# Patient Record
Sex: FEMALE | Race: WHITE | Employment: FULL TIME | URBAN - NONMETROPOLITAN AREA
[De-identification: names, ages, dates, MRNs, and addresses within clinical notes are randomized per-mention and may not be internally consistent; named-entity substitution may affect disease eponyms.]

---

## 2018-06-02 ENCOUNTER — HOSPITAL ENCOUNTER (OUTPATIENT)
Dept: GENERAL RADIOLOGY | Age: 23
Discharge: HOME | End: 2018-06-04
Payer: COMMERCIAL

## 2018-06-02 ENCOUNTER — OFFICE VISIT (OUTPATIENT)
Dept: PRIMARY CARE CLINIC | Age: 23
End: 2018-06-02
Payer: COMMERCIAL

## 2018-06-02 ENCOUNTER — HOSPITAL ENCOUNTER (OUTPATIENT)
Age: 23
Discharge: HOME | End: 2018-06-04
Payer: COMMERCIAL

## 2018-06-02 VITALS
TEMPERATURE: 100.1 F | OXYGEN SATURATION: 98 % | BODY MASS INDEX: 31.39 KG/M2 | DIASTOLIC BLOOD PRESSURE: 76 MMHG | HEART RATE: 78 BPM | WEIGHT: 200 LBS | HEIGHT: 67 IN | SYSTOLIC BLOOD PRESSURE: 108 MMHG

## 2018-06-02 DIAGNOSIS — M79.645 PAIN OF LEFT THUMB: ICD-10-CM

## 2018-06-02 DIAGNOSIS — S63.642A SPRAIN OF METACARPOPHALANGEAL (MCP) JOINT OF LEFT THUMB, INITIAL ENCOUNTER: Primary | ICD-10-CM

## 2018-06-02 PROCEDURE — 99203 OFFICE O/P NEW LOW 30 MIN: CPT | Performed by: FAMILY MEDICINE

## 2018-06-02 PROCEDURE — 73130 X-RAY EXAM OF HAND: CPT

## 2018-06-02 PROCEDURE — L3908 WHO COCK-UP NONMOLDE PRE OTS: HCPCS | Performed by: FAMILY MEDICINE

## 2018-06-02 RX ORDER — PREDNISONE 20 MG/1
TABLET ORAL
Qty: 10 TABLET | Refills: 0 | Status: SHIPPED | OUTPATIENT
Start: 2018-06-02 | End: 2020-03-25

## 2018-06-02 ASSESSMENT — PATIENT HEALTH QUESTIONNAIRE - PHQ9
SUM OF ALL RESPONSES TO PHQ QUESTIONS 1-9: 0
SUM OF ALL RESPONSES TO PHQ9 QUESTIONS 1 & 2: 0
2. FEELING DOWN, DEPRESSED OR HOPELESS: 0
1. LITTLE INTEREST OR PLEASURE IN DOING THINGS: 0

## 2018-06-03 ASSESSMENT — ENCOUNTER SYMPTOMS
GASTROINTESTINAL NEGATIVE: 1
RESPIRATORY NEGATIVE: 1
EYES NEGATIVE: 1
BACK PAIN: 0

## 2020-03-25 ENCOUNTER — OFFICE VISIT (OUTPATIENT)
Dept: PRIMARY CARE CLINIC | Age: 25
End: 2020-03-25
Payer: COMMERCIAL

## 2020-03-25 VITALS
DIASTOLIC BLOOD PRESSURE: 74 MMHG | TEMPERATURE: 98.6 F | OXYGEN SATURATION: 100 % | SYSTOLIC BLOOD PRESSURE: 122 MMHG | HEART RATE: 84 BPM

## 2020-03-25 PROCEDURE — 99213 OFFICE O/P EST LOW 20 MIN: CPT | Performed by: NURSE PRACTITIONER

## 2020-03-25 PROCEDURE — 93000 ELECTROCARDIOGRAM COMPLETE: CPT | Performed by: NURSE PRACTITIONER

## 2020-03-25 ASSESSMENT — PATIENT HEALTH QUESTIONNAIRE - PHQ9
SUM OF ALL RESPONSES TO PHQ QUESTIONS 1-9: 0
1. LITTLE INTEREST OR PLEASURE IN DOING THINGS: 0
SUM OF ALL RESPONSES TO PHQ QUESTIONS 1-9: 0
2. FEELING DOWN, DEPRESSED OR HOPELESS: 0
SUM OF ALL RESPONSES TO PHQ9 QUESTIONS 1 & 2: 0

## 2020-03-25 ASSESSMENT — ENCOUNTER SYMPTOMS
NAUSEA: 0
VOMITING: 0
COUGH: 0
ABDOMINAL PAIN: 0
RESPIRATORY NEGATIVE: 1

## 2020-03-25 NOTE — PATIENT INSTRUCTIONS
Patient Education        Cardiac Arrhythmia: Care Instructions  Your Care Instructions    A cardiac arrhythmia is a change in the normal rhythm of the heart. Your heart may beat too fast or too slow or beat with an irregular or skipping rhythm. A change in the heart's rhythm may feel like a really strong heartbeat or a fluttering in your chest. A severe heart rhythm problem can keep the body from getting the blood it needs. This can result in shortness of breath, lightheadedness, and fainting. You may take medicine to treat your condition. Your doctor may recommend a pacemaker or recommend catheter ablation to destroy small parts of the heart that are causing a rhythm problem. Another possible treatment is an implantable cardioverter-defibrillator (ICD). An ICD is a device that gives the heart a shock to return the heart to a normal rhythm. Follow-up care is a key part of your treatment and safety. Be sure to make and go to all appointments, and call your doctor if you are having problems. It's also a good idea to know your test results and keep a list of the medicines you take. How can you care for yourself at home?  [facility]    · Be safe with medicines. Take your medicines exactly as prescribed. Call your doctor if you think you are having a problem with your medicine. You will get more details on the specific medicines your doctor prescribes.     · If you received a pacemaker or an ICD, you will get a fact sheet about it.     · Wear medical alert jewelry that says you have an abnormal heart rhythm. You can buy this at most drugstores.    Lifestyle changes    · Eat a heart-healthy diet.     · Stay at a healthy weight. Lose weight if you need to.     · Avoid nicotine, too much alcohol, and illegal drugs (meth, speed, and cocaine). Also, get enough sleep and do not overeat.     · Ask your doctor whether you can take over-the-counter medicines (such as decongestants).  These can make your heart beat fast.   · Talk to your doctor about any limits to activities, such as driving, or tasks where you use power tools or ladders. Activity    · Start light exercise if your doctor says you can. Even a small amount will help you get stronger, have more energy, and manage your stress.     · Get regular exercise. Try for 30 minutes on most days of the week. Ask your doctor what level of exercise is safe for you. If activity is not likely to cause health problems, you probably do not have limits on the type or level of activity that you can do. You may want to walk, swim, bike, or do other activities.     · When you exercise, watch for signs that your heart is working too hard. You are pushing too hard if you cannot talk while you exercise. If you become short of breath or dizzy or have chest pain, sit down and rest.     · Check your pulse daily. Place two fingers on the artery at the palm side of your wrist, in line with your thumb. If your heartbeat seems uneven, talk to your doctor. When should you call for help? Call 911 anytime you think you may need emergency care. For example, call if:    · You have symptoms of sudden heart failure. These may include:  ? Severe trouble breathing. ? A fast or irregular heartbeat. ? Coughing up pink, foamy mucus. ? You passed out.     · You have signs of a stroke. These include:  ? Sudden numbness, paralysis, or weakness in your face, arm, or leg, especially on only one side of your body. ? New problems with walking or balance. ? Sudden vision changes. ? Drooling or slurred speech. ? New problems speaking or understanding simple statements, or feeling confused. ? A sudden, severe headache that is different from past headaches.    Call your doctor now or seek immediate medical care if:    · You have new or changed symptoms of heart failure, such as:  ? New or increased shortness of breath. ? New or worse swelling in your legs, ankles, or feet.   ? Sudden weight gain, such as more than 2 to 3 pounds in a day or 5 pounds in a week. (Your doctor may suggest a different range of weight gain.)  ? Feeling dizzy or lightheaded or like you may faint. ? Feeling so tired or weak that you cannot do your usual activities. ? Not sleeping well. Shortness of breath wakes you at night. You need extra pillows to prop yourself up to breathe easier.    Watch closely for changes in your health, and be sure to contact your doctor if:    · You do not get better as expected. Where can you learn more? Go to https://[facility].[employer]. org and sign in to your [employer] account. Enter S588 in the [employer] box to learn more about \"Cardiac Arrhythmia: Care Instructions. \"     If you do not have an account, please click on the \"Sign Up Now\" link. Current as of: December 15, 2019Content Version: 12.4  © 7059-4979 Healthwise, Incorporated. Care instructions adapted under license by [facility] [place] ([facility]). If you have questions about a medical condition or this instruction, always ask your healthcare professional. [place] [address] any warranty or liability for your use of this information.

## 2020-03-25 NOTE — PROGRESS NOTES
[facility]             [address], [address]                        Telephone [phone]             Fax [phone]  1995  PVO:[id number]   Date of visit:  3/25/2020    Subjective:    Enma Pan is a 21 y.o.  female who presents to [facility] today (3/25/2020) for evaluation of:    Chief Complaint   Patient presents with    Tachycardia     feels dizzy then feels like going to pass out        Other   This is a new problem. The current episode started more than 1 month ago (X 2-3 months). Episode frequency: once per week lasting 2-3 minutes. The problem has been unchanged. Pertinent negatives include no abdominal pain, chest pain, chills, congestion, coughing, fever, headaches, nausea, vertigo or vomiting. Associated symptoms comments: Fast heart beat \"beating out of my chest\" while at work, feels light headedness, shortness of breath and feels like she could pass out; occurs once/week and lasting 2-3 minutes; last episode was yesterday while sitting at work, she drank an energy drink 1-2 hours prior to the episode. Denies chest pain during episodes. Patient feels that she has a history of anxiety and stress, but never diagnosed or treated for anxiety. She denies anxiety yesterday or at this time. . Nothing aggravates the symptoms. She has tried nothing for the symptoms. The treatment provided no relief. She has the following problem list:  There is no problem list on file for this patient. Current medications are:  No current outpatient medications on file. No current facility-administered medications for this visit. She is allergic to augmentin [amoxicillin-pot clavulanate]. .    She  reports that she has never smoked.  She has never used smokeless tobacco.      Objective:    Vitals:    03/25/20 1133 03/25/20 1137 03/25/20 1138   BP: 116/74 118/76 122/74   Site: Left Upper Arm Left Upper Arm Left Upper Arm   Position: Supine Sitting Standing   Cuff Size: Large Adult Large Adult Large Adult   Pulse: 68 64 84   Temp: 98.6 °F (37 °C)     TempSrc: Tympanic     SpO2: 99% 100% 100%     There is no height or weight on file to calculate BMI. Review of Systems   Constitutional: Negative. Negative for chills and fever. HENT: Negative for congestion. Respiratory: Negative. Negative for cough. Cardiovascular: Negative for chest pain. Denies symptoms at this time. Racing heart beat lasting 2-3 minutes and occurs once/week. Gastrointestinal: Negative for abdominal pain, nausea and vomiting. Neurological: Negative for dizziness, vertigo, light-headedness and headaches. Physical Exam  Vitals signs and nursing note reviewed. Constitutional:       Appearance: She is well-developed. HENT:      Head: Normocephalic. Jaw: There is normal jaw occlusion. Right Ear: Tympanic membrane, ear canal and external ear normal.      Left Ear: Tympanic membrane, ear canal and external ear normal.      Nose: Nose normal.      Mouth/Throat:      Lips: Pink. Mouth: Mucous membranes are moist.      Pharynx: Oropharynx is clear. Uvula midline. Eyes:      Pupils: Pupils are equal, round, and reactive to light. Neck:      Musculoskeletal: Normal range of motion and neck supple. Cardiovascular:      Rate and Rhythm: Normal rate and regular rhythm. Pulses: Normal pulses. Heart sounds: Normal heart sounds. Pulmonary:      Effort: Pulmonary effort is normal.      Breath sounds: Normal breath sounds. Lymphadenopathy:      Cervical: No cervical adenopathy. Skin:     General: Skin is warm and dry. Neurological:      Mental Status: She is alert and oriented to person, place, and time. Psychiatric:         Behavior: Behavior normal.         Thought Content:  Thought content normal.       Assessment and Plan:    No results found for this visit on 03/25/20. Diagnosis Orders   1. Tachycardia  EKG 12 Lead     EKG showed normal sinus rhythm at 75 bpm.  Denied symptoms with orthostatic blood pressure. We discussed halter monitor and referral to cardiology, patient declined at this time. Stop drinking energy drinks and intake of caffeine. Follow up with PCP if symptoms persist. Go to ER if symptoms worsen. The use, risks, benefits, and side effects of prescribed or recommended medications were discussed. All questions were answered and the patient/caregiver voiced understanding. No orders of the defined types were placed in this encounter.         Electronically signed by FLAVIO Sarabia CNP on 3/25/20 at 11:54 AM EDT

## 2021-08-11 ENCOUNTER — HOSPITAL ENCOUNTER (INPATIENT)
Age: 26
LOS: 2 days | Discharge: HOME | DRG: 082 | End: 2021-08-13
Attending: EMERGENCY MEDICINE | Admitting: SURGERY
Payer: COMMERCIAL

## 2021-08-11 ENCOUNTER — APPOINTMENT (OUTPATIENT)
Dept: CT IMAGING | Age: 26
DRG: 082 | End: 2021-08-11
Payer: COMMERCIAL

## 2021-08-11 DIAGNOSIS — I60.9 SAH (SUBARACHNOID HEMORRHAGE) (HCC): Primary | ICD-10-CM

## 2021-08-11 DIAGNOSIS — N30.00 ACUTE CYSTITIS WITHOUT HEMATURIA: ICD-10-CM

## 2021-08-11 DIAGNOSIS — H74.8X1 HEMOTYMPANUM, RIGHT: ICD-10-CM

## 2021-08-11 LAB
-: ABNORMAL
ABO/RH: NORMAL
ALLEN TEST: ABNORMAL
AMORPHOUS: ABNORMAL
AMPHETAMINE SCREEN URINE: NEGATIVE
ANION GAP SERPL CALCULATED.3IONS-SCNC: 18 MMOL/L (ref 9–17)
ANTIBODY SCREEN: NEGATIVE
ARM BAND NUMBER: NORMAL
BACTERIA: ABNORMAL
BARBITURATE SCREEN URINE: NEGATIVE
BENZODIAZEPINE SCREEN, URINE: NEGATIVE
BILIRUBIN URINE: NEGATIVE
BLOOD BANK SPECIMEN: ABNORMAL
BUN BLDV-MCNC: 7 MG/DL (ref 6–20)
BUPRENORPHINE URINE: NORMAL
CANNABINOID SCREEN URINE: NEGATIVE
CARBOXYHEMOGLOBIN: 0.5 % (ref 0–5)
CASTS UA: ABNORMAL /LPF (ref 0–8)
CHLORIDE BLD-SCNC: 102 MMOL/L (ref 98–107)
CO2: 20 MMOL/L (ref 20–31)
COCAINE METABOLITE, URINE: NEGATIVE
COLOR: YELLOW
COMMENT UA: ABNORMAL
CREAT SERPL-MCNC: 0.59 MG/DL (ref 0.5–0.9)
CRYSTALS, UA: ABNORMAL /HPF
EPITHELIAL CELLS UA: ABNORMAL /HPF (ref 0–5)
ETHANOL PERCENT: 0.05 %
ETHANOL: 50 MG/DL
EXPIRATION DATE: NORMAL
FIO2: ABNORMAL
GFR AFRICAN AMERICAN: >60 ML/MIN
GFR NON-AFRICAN AMERICAN: >60 ML/MIN
GFR SERPL CREATININE-BSD FRML MDRD: ABNORMAL ML/MIN/{1.73_M2}
GFR SERPL CREATININE-BSD FRML MDRD: ABNORMAL ML/MIN/{1.73_M2}
GLUCOSE BLD-MCNC: 98 MG/DL (ref 70–99)
GLUCOSE URINE: NEGATIVE
HCG QUALITATIVE: NEGATIVE
HCO3 VENOUS: 21.7 MMOL/L (ref 24–30)
HCT VFR BLD CALC: 39 % (ref 36.3–47.1)
HEMOGLOBIN: 12.8 G/DL (ref 11.9–15.1)
INR BLD: 1
KETONES, URINE: ABNORMAL
LEUKOCYTE ESTERASE, URINE: ABNORMAL
MCH RBC QN AUTO: 33.1 PG (ref 25.2–33.5)
MCHC RBC AUTO-ENTMCNC: 32.8 G/DL (ref 28.4–34.8)
MCV RBC AUTO: 100.8 FL (ref 82.6–102.9)
MDMA URINE: NORMAL
METHADONE SCREEN, URINE: NEGATIVE
METHAMPHETAMINE, URINE: NORMAL
METHEMOGLOBIN: ABNORMAL % (ref 0–1.5)
MODE: ABNORMAL
MUCUS: ABNORMAL
NEGATIVE BASE EXCESS, VEN: 3.2 MMOL/L (ref 0–2)
NITRITE, URINE: POSITIVE
NOTIFICATION TIME: ABNORMAL
NOTIFICATION: ABNORMAL
NRBC AUTOMATED: 0 PER 100 WBC
O2 DEVICE/FLOW/%: ABNORMAL
O2 SAT, VEN: 71.5 % (ref 60–85)
OPIATES, URINE: NEGATIVE
OTHER OBSERVATIONS UA: ABNORMAL
OXYCODONE SCREEN URINE: NEGATIVE
OXYHEMOGLOBIN: ABNORMAL % (ref 95–98)
PARTIAL THROMBOPLASTIN TIME: 20.1 SEC (ref 20.5–30.5)
PATIENT TEMP: 37
PCO2, VEN, TEMP ADJ: ABNORMAL MMHG (ref 39–55)
PCO2, VEN: 40.7 (ref 39–55)
PDW BLD-RTO: 10.9 % (ref 11.8–14.4)
PEEP/CPAP: ABNORMAL
PH UA: 6 (ref 5–8)
PH VENOUS: 7.35 (ref 7.32–7.42)
PH, VEN, TEMP ADJ: ABNORMAL (ref 7.32–7.42)
PHENCYCLIDINE, URINE: NEGATIVE
PLATELET # BLD: 280 K/UL (ref 138–453)
PMV BLD AUTO: 9.3 FL (ref 8.1–13.5)
PO2, VEN, TEMP ADJ: ABNORMAL MMHG (ref 30–50)
PO2, VEN: 41.3 (ref 30–50)
POSITIVE BASE EXCESS, VEN: ABNORMAL MMOL/L (ref 0–2)
POTASSIUM SERPL-SCNC: 3.6 MMOL/L (ref 3.7–5.3)
PROPOXYPHENE, URINE: NORMAL
PROTEIN UA: NEGATIVE
PROTHROMBIN TIME: 10.6 SEC (ref 9.1–12.3)
PSV: ABNORMAL
PT. POSITION: ABNORMAL
RBC # BLD: 3.87 M/UL (ref 3.95–5.11)
RBC UA: ABNORMAL /HPF (ref 0–4)
RENAL EPITHELIAL, UA: ABNORMAL /HPF
RESPIRATORY RATE: ABNORMAL
SAMPLE SITE: ABNORMAL
SARS-COV-2, RAPID: DETECTED
SET RATE: ABNORMAL
SODIUM BLD-SCNC: 140 MMOL/L (ref 135–144)
SPECIFIC GRAVITY UA: 1.01 (ref 1–1.03)
SPECIMEN DESCRIPTION: ABNORMAL
TEST INFORMATION: NORMAL
TEXT FOR RESPIRATORY: ABNORMAL
TOTAL HB: ABNORMAL G/DL (ref 12–16)
TOTAL RATE: ABNORMAL
TRICHOMONAS: ABNORMAL
TRICYCLIC ANTIDEPRESSANTS, UR: NORMAL
TURBIDITY: CLEAR
URINE HGB: ABNORMAL
UROBILINOGEN, URINE: NORMAL
VT: ABNORMAL
WBC # BLD: 18.3 K/UL (ref 3.5–11.3)
WBC UA: ABNORMAL /HPF (ref 0–5)
YEAST: ABNORMAL

## 2021-08-11 PROCEDURE — 6360000002 HC RX W HCPCS: Performed by: STUDENT IN AN ORGANIZED HEALTH CARE EDUCATION/TRAINING PROGRAM

## 2021-08-11 PROCEDURE — 70450 CT HEAD/BRAIN W/O DYE: CPT

## 2021-08-11 PROCEDURE — 72125 CT NECK SPINE W/O DYE: CPT

## 2021-08-11 PROCEDURE — 71260 CT THORAX DX C+: CPT

## 2021-08-11 PROCEDURE — 3209999900 CT THORACIC SPINE TRAUMA RECONSTRUCTION

## 2021-08-11 PROCEDURE — 2000000000 HC ICU R&B

## 2021-08-11 PROCEDURE — 99285 EMERGENCY DEPT VISIT HI MDM: CPT

## 2021-08-11 PROCEDURE — 84703 CHORIONIC GONADOTROPIN ASSAY: CPT

## 2021-08-11 PROCEDURE — 85027 COMPLETE CBC AUTOMATED: CPT

## 2021-08-11 PROCEDURE — 6360000004 HC RX CONTRAST MEDICATION: Performed by: STUDENT IN AN ORGANIZED HEALTH CARE EDUCATION/TRAINING PROGRAM

## 2021-08-11 PROCEDURE — 6370000000 HC RX 637 (ALT 250 FOR IP): Performed by: STUDENT IN AN ORGANIZED HEALTH CARE EDUCATION/TRAINING PROGRAM

## 2021-08-11 PROCEDURE — 87641 MR-STAPH DNA AMP PROBE: CPT

## 2021-08-11 PROCEDURE — 80307 DRUG TEST PRSMV CHEM ANLYZR: CPT

## 2021-08-11 PROCEDURE — 3209999900 CT LUMBAR SPINE TRAUMA RECONSTRUCTION

## 2021-08-11 PROCEDURE — 82805 BLOOD GASES W/O2 SATURATION: CPT

## 2021-08-11 PROCEDURE — 70496 CT ANGIOGRAPHY HEAD: CPT

## 2021-08-11 PROCEDURE — 85730 THROMBOPLASTIN TIME PARTIAL: CPT

## 2021-08-11 PROCEDURE — 86850 RBC ANTIBODY SCREEN: CPT

## 2021-08-11 PROCEDURE — G0480 DRUG TEST DEF 1-7 CLASSES: HCPCS

## 2021-08-11 PROCEDURE — 96374 THER/PROPH/DIAG INJ IV PUSH: CPT

## 2021-08-11 PROCEDURE — 85610 PROTHROMBIN TIME: CPT

## 2021-08-11 PROCEDURE — 2580000003 HC RX 258: Performed by: STUDENT IN AN ORGANIZED HEALTH CARE EDUCATION/TRAINING PROGRAM

## 2021-08-11 PROCEDURE — 81001 URINALYSIS AUTO W/SCOPE: CPT

## 2021-08-11 PROCEDURE — 82947 ASSAY GLUCOSE BLOOD QUANT: CPT

## 2021-08-11 PROCEDURE — 84520 ASSAY OF UREA NITROGEN: CPT

## 2021-08-11 PROCEDURE — 82565 ASSAY OF CREATININE: CPT

## 2021-08-11 PROCEDURE — 86901 BLOOD TYPING SEROLOGIC RH(D): CPT

## 2021-08-11 PROCEDURE — 86900 BLOOD TYPING SEROLOGIC ABO: CPT

## 2021-08-11 PROCEDURE — 80051 ELECTROLYTE PANEL: CPT

## 2021-08-11 PROCEDURE — 87635 SARS-COV-2 COVID-19 AMP PRB: CPT

## 2021-08-11 RX ORDER — POLYETHYLENE GLYCOL 3350 17 G/17G
17 POWDER, FOR SOLUTION ORAL DAILY
Status: DISCONTINUED | OUTPATIENT
Start: 2021-08-12 | End: 2021-08-13 | Stop reason: HOSPADM

## 2021-08-11 RX ORDER — ACETAMINOPHEN 500 MG
1000 TABLET ORAL EVERY 8 HOURS SCHEDULED
Status: DISCONTINUED | OUTPATIENT
Start: 2021-08-11 | End: 2021-08-13 | Stop reason: HOSPADM

## 2021-08-11 RX ORDER — ONDANSETRON 4 MG/1
4 TABLET, ORALLY DISINTEGRATING ORAL EVERY 8 HOURS PRN
Status: DISCONTINUED | OUTPATIENT
Start: 2021-08-11 | End: 2021-08-13 | Stop reason: HOSPADM

## 2021-08-11 RX ORDER — POTASSIUM CHLORIDE 20 MEQ/1
40 TABLET, EXTENDED RELEASE ORAL ONCE
Status: DISCONTINUED | OUTPATIENT
Start: 2021-08-11 | End: 2021-08-13 | Stop reason: HOSPADM

## 2021-08-11 RX ORDER — METHOCARBAMOL 500 MG/1
750 TABLET, FILM COATED ORAL EVERY 6 HOURS
Status: DISCONTINUED | OUTPATIENT
Start: 2021-08-11 | End: 2021-08-13 | Stop reason: HOSPADM

## 2021-08-11 RX ORDER — SODIUM PHOSPHATE, DIBASIC AND SODIUM PHOSPHATE, MONOBASIC 7; 19 G/133ML; G/133ML
1 ENEMA RECTAL DAILY PRN
Status: CANCELLED | OUTPATIENT
Start: 2021-08-11

## 2021-08-11 RX ORDER — SODIUM CHLORIDE 0.9 % (FLUSH) 0.9 %
5-40 SYRINGE (ML) INJECTION PRN
Status: DISCONTINUED | OUTPATIENT
Start: 2021-08-11 | End: 2021-08-13 | Stop reason: HOSPADM

## 2021-08-11 RX ORDER — OXYCODONE HYDROCHLORIDE 5 MG/1
5 TABLET ORAL EVERY 6 HOURS PRN
Status: DISCONTINUED | OUTPATIENT
Start: 2021-08-11 | End: 2021-08-13 | Stop reason: HOSPADM

## 2021-08-11 RX ORDER — SODIUM CHLORIDE 9 MG/ML
25 INJECTION, SOLUTION INTRAVENOUS PRN
Status: DISCONTINUED | OUTPATIENT
Start: 2021-08-11 | End: 2021-08-13 | Stop reason: HOSPADM

## 2021-08-11 RX ORDER — FENTANYL CITRATE 50 UG/ML
50 INJECTION, SOLUTION INTRAMUSCULAR; INTRAVENOUS ONCE
Status: COMPLETED | OUTPATIENT
Start: 2021-08-11 | End: 2021-08-11

## 2021-08-11 RX ORDER — ONDANSETRON 2 MG/ML
4 INJECTION INTRAMUSCULAR; INTRAVENOUS EVERY 6 HOURS PRN
Status: DISCONTINUED | OUTPATIENT
Start: 2021-08-11 | End: 2021-08-13 | Stop reason: HOSPADM

## 2021-08-11 RX ORDER — SODIUM CHLORIDE 9 MG/ML
INJECTION, SOLUTION INTRAVENOUS CONTINUOUS
Status: DISCONTINUED | OUTPATIENT
Start: 2021-08-11 | End: 2021-08-12

## 2021-08-11 RX ORDER — LEVETIRACETAM 5 MG/ML
500 INJECTION INTRAVASCULAR EVERY 12 HOURS
Status: DISCONTINUED | OUTPATIENT
Start: 2021-08-12 | End: 2021-08-12

## 2021-08-11 RX ORDER — BISACODYL 10 MG
10 SUPPOSITORY, RECTAL RECTAL DAILY PRN
Status: CANCELLED | OUTPATIENT
Start: 2021-08-11

## 2021-08-11 RX ORDER — SODIUM CHLORIDE 0.9 % (FLUSH) 0.9 %
5-40 SYRINGE (ML) INJECTION EVERY 12 HOURS SCHEDULED
Status: DISCONTINUED | OUTPATIENT
Start: 2021-08-11 | End: 2021-08-13 | Stop reason: HOSPADM

## 2021-08-11 RX ORDER — LEVETIRACETAM 5 MG/ML
500 INJECTION INTRAVASCULAR EVERY 12 HOURS
Status: DISCONTINUED | OUTPATIENT
Start: 2021-08-11 | End: 2021-08-11

## 2021-08-11 RX ORDER — GABAPENTIN 600 MG/1
300 TABLET ORAL EVERY 8 HOURS
Status: DISCONTINUED | OUTPATIENT
Start: 2021-08-11 | End: 2021-08-13 | Stop reason: HOSPADM

## 2021-08-11 RX ADMIN — ONDANSETRON 4 MG: 2 INJECTION INTRAMUSCULAR; INTRAVENOUS at 21:26

## 2021-08-11 RX ADMIN — CEFTRIAXONE SODIUM 1000 MG: 1 INJECTION, POWDER, FOR SOLUTION INTRAMUSCULAR; INTRAVENOUS at 23:14

## 2021-08-11 RX ADMIN — FENTANYL CITRATE 50 MCG: 50 INJECTION, SOLUTION INTRAMUSCULAR; INTRAVENOUS at 19:59

## 2021-08-11 RX ADMIN — METHOCARBAMOL TABLETS 750 MG: 500 TABLET, COATED ORAL at 21:21

## 2021-08-11 RX ADMIN — IOPAMIDOL 150 ML: 755 INJECTION, SOLUTION INTRAVENOUS at 22:59

## 2021-08-11 RX ADMIN — ACETAMINOPHEN 1000 MG: 500 TABLET ORAL at 21:21

## 2021-08-11 RX ADMIN — SODIUM CHLORIDE: 9 INJECTION, SOLUTION INTRAVENOUS at 22:10

## 2021-08-11 RX ADMIN — OXYCODONE HYDROCHLORIDE 5 MG: 5 TABLET ORAL at 21:21

## 2021-08-11 RX ADMIN — SODIUM CHLORIDE, PRESERVATIVE FREE 10 ML: 5 INJECTION INTRAVENOUS at 21:22

## 2021-08-11 ASSESSMENT — PAIN DESCRIPTION - LOCATION
LOCATION: HEAD
LOCATION: HEAD

## 2021-08-11 ASSESSMENT — PAIN DESCRIPTION - FREQUENCY
FREQUENCY: CONTINUOUS
FREQUENCY: CONTINUOUS

## 2021-08-11 ASSESSMENT — PAIN SCALES - GENERAL
PAINLEVEL_OUTOF10: 10

## 2021-08-11 ASSESSMENT — PAIN DESCRIPTION - PAIN TYPE
TYPE: ACUTE PAIN
TYPE: ACUTE PAIN

## 2021-08-11 ASSESSMENT — PAIN DESCRIPTION - DESCRIPTORS
DESCRIPTORS: ACHING
DESCRIPTORS: ACHING
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
## 2021-08-12 ENCOUNTER — APPOINTMENT (OUTPATIENT)
Dept: CT IMAGING | Age: 26
DRG: 082 | End: 2021-08-12
Payer: COMMERCIAL

## 2021-08-12 LAB
ANION GAP SERPL CALCULATED.3IONS-SCNC: 16 MMOL/L (ref 9–17)
BUN BLDV-MCNC: 5 MG/DL (ref 6–20)
BUN/CREAT BLD: ABNORMAL (ref 9–20)
C-REACTIVE PROTEIN: 26.2 MG/L (ref 0–5)
CALCIUM SERPL-MCNC: 9 MG/DL (ref 8.6–10.4)
CHLORIDE BLD-SCNC: 100 MMOL/L (ref 98–107)
CO2: 18 MMOL/L (ref 20–31)
CREAT SERPL-MCNC: 0.46 MG/DL (ref 0.5–0.9)
GFR AFRICAN AMERICAN: >60 ML/MIN
GFR NON-AFRICAN AMERICAN: >60 ML/MIN
GFR SERPL CREATININE-BSD FRML MDRD: ABNORMAL ML/MIN/{1.73_M2}
GFR SERPL CREATININE-BSD FRML MDRD: ABNORMAL ML/MIN/{1.73_M2}
GLUCOSE BLD-MCNC: 100 MG/DL (ref 70–99)
HCT VFR BLD CALC: 38 % (ref 36.3–47.1)
HEMOGLOBIN: 12.5 G/DL (ref 11.9–15.1)
MCH RBC QN AUTO: 33 PG (ref 25.2–33.5)
MCHC RBC AUTO-ENTMCNC: 32.9 G/DL (ref 28.4–34.8)
MCV RBC AUTO: 100.3 FL (ref 82.6–102.9)
MRSA, DNA, NASAL: NORMAL
NRBC AUTOMATED: 0 PER 100 WBC
PDW BLD-RTO: 10.8 % (ref 11.8–14.4)
PLATELET # BLD: 272 K/UL (ref 138–453)
PMV BLD AUTO: 9.1 FL (ref 8.1–13.5)
POTASSIUM SERPL-SCNC: 4 MMOL/L (ref 3.7–5.3)
RBC # BLD: 3.79 M/UL (ref 3.95–5.11)
SODIUM BLD-SCNC: 134 MMOL/L (ref 135–144)
SPECIMEN DESCRIPTION: NORMAL
WBC # BLD: 18.8 K/UL (ref 3.5–11.3)

## 2021-08-12 PROCEDURE — 70450 CT HEAD/BRAIN W/O DYE: CPT

## 2021-08-12 PROCEDURE — 97530 THERAPEUTIC ACTIVITIES: CPT

## 2021-08-12 PROCEDURE — APPSS15 APP SPLIT SHARED TIME 0-15 MINUTES: Performed by: NURSE PRACTITIONER

## 2021-08-12 PROCEDURE — 6370000000 HC RX 637 (ALT 250 FOR IP): Performed by: STUDENT IN AN ORGANIZED HEALTH CARE EDUCATION/TRAINING PROGRAM

## 2021-08-12 PROCEDURE — 97165 OT EVAL LOW COMPLEX 30 MIN: CPT

## 2021-08-12 PROCEDURE — 86140 C-REACTIVE PROTEIN: CPT

## 2021-08-12 PROCEDURE — 92523 SPEECH SOUND LANG COMPREHEN: CPT

## 2021-08-12 PROCEDURE — 80048 BASIC METABOLIC PNL TOTAL CA: CPT

## 2021-08-12 PROCEDURE — 99223 1ST HOSP IP/OBS HIGH 75: CPT | Performed by: NEUROLOGICAL SURGERY

## 2021-08-12 PROCEDURE — 99253 IP/OBS CNSLTJ NEW/EST LOW 45: CPT | Performed by: INTERNAL MEDICINE

## 2021-08-12 PROCEDURE — 2060000000 HC ICU INTERMEDIATE R&B

## 2021-08-12 PROCEDURE — 36415 COLL VENOUS BLD VENIPUNCTURE: CPT

## 2021-08-12 PROCEDURE — 2580000003 HC RX 258: Performed by: STUDENT IN AN ORGANIZED HEALTH CARE EDUCATION/TRAINING PROGRAM

## 2021-08-12 PROCEDURE — 6360000002 HC RX W HCPCS: Performed by: STUDENT IN AN ORGANIZED HEALTH CARE EDUCATION/TRAINING PROGRAM

## 2021-08-12 PROCEDURE — 85027 COMPLETE CBC AUTOMATED: CPT

## 2021-08-12 PROCEDURE — 97535 SELF CARE MNGMENT TRAINING: CPT

## 2021-08-12 PROCEDURE — 97161 PT EVAL LOW COMPLEX 20 MIN: CPT

## 2021-08-12 RX ORDER — IBUPROFEN 400 MG/1
400 TABLET ORAL EVERY 4 HOURS
Status: DISCONTINUED | OUTPATIENT
Start: 2021-08-12 | End: 2021-08-13

## 2021-08-12 RX ADMIN — ONDANSETRON 4 MG: 2 INJECTION INTRAMUSCULAR; INTRAVENOUS at 18:23

## 2021-08-12 RX ADMIN — LEVETIRACETAM 500 MG: 5 INJECTION INTRAVENOUS at 08:35

## 2021-08-12 RX ADMIN — ONDANSETRON 4 MG: 2 INJECTION INTRAMUSCULAR; INTRAVENOUS at 05:21

## 2021-08-12 RX ADMIN — SODIUM CHLORIDE: 9 INJECTION, SOLUTION INTRAVENOUS at 05:38

## 2021-08-12 RX ADMIN — IBUPROFEN 400 MG: 400 TABLET, FILM COATED ORAL at 21:37

## 2021-08-12 RX ADMIN — METHOCARBAMOL TABLETS 750 MG: 500 TABLET, COATED ORAL at 15:43

## 2021-08-12 RX ADMIN — METHOCARBAMOL TABLETS 750 MG: 500 TABLET, COATED ORAL at 08:28

## 2021-08-12 RX ADMIN — ACETAMINOPHEN 1000 MG: 500 TABLET ORAL at 18:04

## 2021-08-12 RX ADMIN — GABAPENTIN 300 MG: 600 TABLET ORAL at 18:04

## 2021-08-12 RX ADMIN — GABAPENTIN 300 MG: 600 TABLET ORAL at 09:02

## 2021-08-12 RX ADMIN — OXYCODONE HYDROCHLORIDE 5 MG: 5 TABLET ORAL at 18:23

## 2021-08-12 RX ADMIN — SODIUM CHLORIDE, PRESERVATIVE FREE 10 ML: 5 INJECTION INTRAVENOUS at 08:32

## 2021-08-12 RX ADMIN — METHOCARBAMOL TABLETS 750 MG: 500 TABLET, COATED ORAL at 21:37

## 2021-08-12 RX ADMIN — ACETAMINOPHEN 1000 MG: 500 TABLET ORAL at 09:01

## 2021-08-12 RX ADMIN — OXYCODONE HYDROCHLORIDE 5 MG: 5 TABLET ORAL at 04:14

## 2021-08-12 RX ADMIN — OXYCODONE HYDROCHLORIDE 5 MG: 5 TABLET ORAL at 10:30

## 2021-08-12 RX ADMIN — SODIUM CHLORIDE, PRESERVATIVE FREE 10 ML: 5 INJECTION INTRAVENOUS at 21:36

## 2021-08-12 RX ADMIN — METHOCARBAMOL TABLETS 750 MG: 500 TABLET, COATED ORAL at 03:08

## 2021-08-12 ASSESSMENT — PAIN DESCRIPTION - DESCRIPTORS
DESCRIPTORS: ACHING;HEADACHE;CONSTANT
DESCRIPTORS: ACHING;CONSTANT
DESCRIPTORS: HEADACHE
DESCRIPTORS: HEADACHE
DESCRIPTORS: ACHING

## 2021-08-12 ASSESSMENT — PAIN DESCRIPTION - PAIN TYPE
TYPE: ACUTE PAIN

## 2021-08-12 ASSESSMENT — PAIN SCALES - GENERAL
PAINLEVEL_OUTOF10: 10
PAINLEVEL_OUTOF10: 8
PAINLEVEL_OUTOF10: 10
PAINLEVEL_OUTOF10: 9
PAINLEVEL_OUTOF10: 10

## 2021-08-12 ASSESSMENT — ENCOUNTER SYMPTOMS
COUGH: 0
VOMITING: 0
NAUSEA: 0
SORE THROAT: 0
ABDOMINAL PAIN: 0
PHOTOPHOBIA: 0
SHORTNESS OF BREATH: 0

## 2021-08-12 ASSESSMENT — PAIN DESCRIPTION - LOCATION
LOCATION: HEAD

## 2021-08-12 ASSESSMENT — PAIN DESCRIPTION - FREQUENCY
FREQUENCY: CONTINUOUS

## 2021-08-12 ASSESSMENT — PAIN - FUNCTIONAL ASSESSMENT
PAIN_FUNCTIONAL_ASSESSMENT: ACTIVITIES ARE NOT PREVENTED
PAIN_FUNCTIONAL_ASSESSMENT: PREVENTS OR INTERFERES SOME ACTIVE ACTIVITIES AND ADLS

## 2021-08-12 ASSESSMENT — PAIN DESCRIPTION - ORIENTATION
ORIENTATION: ANTERIOR;UPPER
ORIENTATION: ANTERIOR
ORIENTATION: ANTERIOR

## 2021-08-12 ASSESSMENT — PAIN DESCRIPTION - PROGRESSION
CLINICAL_PROGRESSION: NOT CHANGED

## 2021-08-12 ASSESSMENT — PAIN DESCRIPTION - ONSET: ONSET: ON-GOING
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
## 2021-08-12 NOTE — CARE COORDINATION
Case Management Initial Discharge Plan  [address],         +covid  Called patients elina castelan to verify information & discuss discharge plans. Information verified: address, contacts, phone number, , insurance Yes  Insurance Provider: none    Emergency Contact/Next of Kin name & number: elina castelan  Who are involved in patient's support system? Family     PCP: Margaux Leigh MD  Date of last visit: 5 yrs ago, goes to urgent care when sick      Discharge Planning    Living Arrangements:    lives with mom     Home has 1 stories      Patient able to perform ADL's:Independent    Current Services (outpatient & in home) none  DME equipment: none      Is patient receiving oral anticoagulation therapy? No home medications listed     Potential Assistance Needed:   establish pcp, help dept financial assistance        Evaluation: no    Expected Discharge date:       Patient expects to be discharged to:   home    If home: is the family and/or caregiver wiling & able to provide support at home? Yes    Who will be providing this support? Mom     Follow Up Appointment: Best Day/ Time:      Transportation provider: family   Transportation arrangements needed for discharge: No    Readmission Risk              Risk of Unplanned Readmission:  6             Does patient have a readmission risk score greater than 14?: No  If yes, follow-up appointment must be made within 7 days of discharge.      Goals of Care: safe transition plan        Educated yes on transitional options, provided freedom of choice and are agreeable with plan      Discharge Plan:  Return home with family           Electronically signed by Conception Apley, RN on 21 at 10:05 AM EDT

## 2021-08-12 NOTE — PROGRESS NOTES
mucous membranes   LUNGS: normal effort, no respiratory distress, no accessory muscle use   HEART: regular rate and rhythm   ABDOMEN: soft, nontender, nondistended, no guarding or peritoneal signs   EXTREMITY: no cyanosis, clubbing or edema, no tenderness to palpation   SKIN: normal coloration and turgor, superficial abrasion to nose     Medications:Current Inpatient  Scheduled Meds:   sodium chloride flush  5-40 mL Intravenous 2 times per day    polyethylene glycol  17 g Oral Daily    acetaminophen  1,000 mg Oral 3 times per day    gabapentin  300 mg Oral Q8H    methocarbamol  750 mg Oral Q6H    cefTRIAXone (ROCEPHIN) IV  1,000 mg Intravenous Q24H    potassium chloride  40 mEq Oral Once     Continuous Infusions:   sodium chloride       PRN Meds:sodium chloride flush, sodium chloride, ondansetron **OR** ondansetron, oxyCODONE    Objective:    CBC:   Recent Labs     08/11/21 2057 08/12/21  1111   WBC 18.3* 18.8*   HGB 12.8 12.5    272     BMP:    Recent Labs     08/11/21 2057 08/12/21  1111    134*   K 3.6* 4.0    100   CO2 20 18*   BUN 7 5*   CREATININE 0.59 0.46*   GLUCOSE 98 100*     Calcium:  Recent Labs     08/12/21  1111   CALCIUM 9.0     INR:   Recent Labs     08/11/21 2057   INR 1.0       Urinalysis:   Recent Labs     08/11/21 2059   BACTERIA MANY*   COLORU YELLOW   PHUR 6.0   PROTEINU NEGATIVE   RBCUA None   SPECGRAV 1.015   BILIRUBINUR NEGATIVE   NITRU POSITIVE*   WBCUA 10 TO 20   LEUKOCYTESUR TRACE*   GLUCOSEU NEGATIVE       Assessment:  Patient Active Problem List   Diagnosis    Subarachnoid bleed (HCC)   Small L SDH   Occipital bone fx, nondisplaced    Plan: 1. Neuro/Pain              -Bl frontal lobe SAH, left SDH               -Nondisplaced Occipital fx, negative CTA               -Neurosurgery consulted and following               -DVT ppx held               -Multimodal pain control: tylenol, gabapentin, robaxin, ray PRN     2. CV              -HR 80's

## 2021-08-12 NOTE — PROGRESS NOTES
Attempted to call patient's mother to notify of patient transfer to Room [phone]. Voicemail left.
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
## 2021-08-12 NOTE — CARE COORDINATION
SBIRT deferred - pt +covid            Alcohol Screening and Brief Intervention        Recent Labs     08/11/21 2057   ALC 50*         Deferred [x]    Completed on: 8/12/2021   RODOLFO Churchill

## 2021-08-12 NOTE — PROGRESS NOTES
ICU PROGRESS NOTE    PATIENT NAME: [facility]  MEDICAL RECORD NO. [id number]  DATE: 2021    PRIMARY CARE PHYSICIAN: Aminah Thakkar MD    HD: # 1    ASSESSMENT    Patient Active Problem List   Diagnosis    Subarachnoid bleed [facility])     MEDICAL DECISION MAKING AND PLAN    1. Neuro/Pain   -Bl frontal lobe SAH, left SDH    -Nondisplaced Occipital fx, negative CTA    -Neurosurgery consulted and following    -DVT ppx held    -Multimodal pain control: tylenol, gabapentin, robaxin, ray PRN    2. CV   -HR 80's    -Normotensive BP     3. Heme   -12.5 (12.8)    4. Pulm   -SATing 100% on RA     5. Renal    -Urinating spontaneously    -Dc fluids now that diet started    -BUN 5/Cr 0.46    -Ca 9/Na 134/K 4/Cl 100     6. GI   -Regular diet     7. ID   -Afebrile   -WBC 18 (18)    -Cefoxitin for UTI    -COVID (+), ID following     8. Endo   -Glucose 100, no insulin needs     9. Lines    -PIV     10. Proph   -GI: not indicated   -DVT: held due to [facility]     11. Dispo    -Transfer to [facility]  Is seen and examined. GCS 15 on exam this morning. Some nausea/emesis overnight. She does have a headache but denies pain elsewhere.        OBJECTIVE  VITALS: Temp: Temp: 98.7 °F (37.1 °C)Temp  Av.6 °F (37 °C)  Min: 97.7 °F (36.5 °C)  Max: 99.1 °F (68.0 °C) BP Systolic (60BTU), YCT:594 , Min:107 , RJZ:975   Diastolic (86GKL), QEL:18, Min:70, Max:96   Pulse Pulse  Av.1  Min: 83  Max: 108 Resp Resp  Av.3  Min: 15  Max: 24 Pulse ox SpO2  Av.6 %  Min: 98 %  Max: 100 %    GENERAL: awake, alert, follows all commands   NEURO: motor and sensation intact to bilateral upper and lower extremities   HEAD: normocephalic, atraumatic   EYES: sclera clear, pupils equal   ENT: moist mucous membranes   LUNGS: normal effort, no respiratory distress, no accessory muscle use   HEART: regular rate and rhythm   ABDOMEN: soft, nontender, nondistended, no guarding or peritoneal signs   EXTERMITY: no cyanosis, clubbing or edema  SKIN: normal coloration and turgor     LAB:  CBC:   Recent Labs     08/11/21 2057 08/12/21  1111   WBC 18.3* 18.8*   HGB 12.8 12.5   HCT 39.0 38.0   .8 100.3    272     BMP:   Recent Labs     08/11/21 2057      K 3.6*      CO2 20   BUN 7   CREATININE 0.59   GLUCOSE 98       RADIOLOGY:  Repeat CT Head 8/12   FINDINGS:   BRAIN/VENTRICLES: There are hemorrhagic contusions in the frontal lobes   bilaterally that are stable compared to prior exam. Esperanza Lisa is a small amount   of adjacent subarachnoid hemorrhage and a small left subdural hemorrhage. There is no significant mass effect.  There is no midline shift.  The   ventricular structures are symmetric and unremarkable.  The infratentorial   structures are unremarkable.       ORBITS: The visualized portion of the orbits demonstrate no acute abnormality.       SINUSES: The visualized paranasal sinuses and mastoid air cells demonstrate   no acute abnormality.       SOFT TISSUES/SKULL:  No acute abnormality of the visualized skull or soft   tissues. Merline Anderson DO             Trauma Attending Attestation      I have reviewed the above GCS note(s) and confirmed the key elements of the medical history and physical exam. I have seen and examined the pt. I have discussed the findings, established the care plan and recommendations with Resident, GCS RN, bedside nurse.         Pratik Brown DO  8/16/2021  8:33 AM
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
## 2021-08-13 VITALS
HEART RATE: 75 BPM | HEIGHT: 67 IN | TEMPERATURE: 98.7 F | RESPIRATION RATE: 17 BRPM | SYSTOLIC BLOOD PRESSURE: 131 MMHG | WEIGHT: 193.34 LBS | DIASTOLIC BLOOD PRESSURE: 74 MMHG | BODY MASS INDEX: 30.35 KG/M2 | OXYGEN SATURATION: 98 %

## 2021-08-13 LAB
ABSOLUTE EOS #: 0 K/UL (ref 0–0.44)
ABSOLUTE IMMATURE GRANULOCYTE: 0.14 K/UL (ref 0–0.3)
ABSOLUTE LYMPH #: 1.15 K/UL (ref 1.1–3.7)
ABSOLUTE MONO #: 1.73 K/UL (ref 0.1–1.2)
ANION GAP SERPL CALCULATED.3IONS-SCNC: 14 MMOL/L (ref 9–17)
BASOPHILS # BLD: 0 % (ref 0–2)
BASOPHILS ABSOLUTE: 0 K/UL (ref 0–0.2)
BUN BLDV-MCNC: 6 MG/DL (ref 6–20)
BUN/CREAT BLD: ABNORMAL (ref 9–20)
C-REACTIVE PROTEIN: 60.8 MG/L (ref 0–5)
CALCIUM SERPL-MCNC: 8.9 MG/DL (ref 8.6–10.4)
CHLORIDE BLD-SCNC: 103 MMOL/L (ref 98–107)
CO2: 19 MMOL/L (ref 20–31)
CREAT SERPL-MCNC: 0.46 MG/DL (ref 0.5–0.9)
DIFFERENTIAL TYPE: ABNORMAL
EOSINOPHILS RELATIVE PERCENT: 0 % (ref 1–4)
GFR AFRICAN AMERICAN: >60 ML/MIN
GFR NON-AFRICAN AMERICAN: >60 ML/MIN
GFR SERPL CREATININE-BSD FRML MDRD: ABNORMAL ML/MIN/{1.73_M2}
GFR SERPL CREATININE-BSD FRML MDRD: ABNORMAL ML/MIN/{1.73_M2}
GLUCOSE BLD-MCNC: 109 MG/DL (ref 70–99)
GLUCOSE BLD-MCNC: 85 MG/DL (ref 65–105)
HCT VFR BLD CALC: 38.2 % (ref 36.3–47.1)
HEMOGLOBIN: 12.5 G/DL (ref 11.9–15.1)
IMMATURE GRANULOCYTES: 1 %
LYMPHOCYTES # BLD: 8 % (ref 24–43)
MCH RBC QN AUTO: 34.1 PG (ref 25.2–33.5)
MCHC RBC AUTO-ENTMCNC: 32.7 G/DL (ref 28.4–34.8)
MCV RBC AUTO: 104.1 FL (ref 82.6–102.9)
MONOCYTES # BLD: 12 % (ref 3–12)
MORPHOLOGY: ABNORMAL
NRBC AUTOMATED: 0 PER 100 WBC
PDW BLD-RTO: 10.9 % (ref 11.8–14.4)
PLATELET # BLD: 382 K/UL (ref 138–453)
PLATELET ESTIMATE: ABNORMAL
PMV BLD AUTO: 10 FL (ref 8.1–13.5)
POTASSIUM SERPL-SCNC: 4.5 MMOL/L (ref 3.7–5.3)
RBC # BLD: 3.67 M/UL (ref 3.95–5.11)
RBC # BLD: ABNORMAL 10*6/UL
SEG NEUTROPHILS: 79 % (ref 36–65)
SEGMENTED NEUTROPHILS ABSOLUTE COUNT: 11.38 K/UL (ref 1.5–8.1)
SODIUM BLD-SCNC: 136 MMOL/L (ref 135–144)
WBC # BLD: 14.4 K/UL (ref 3.5–11.3)
WBC # BLD: ABNORMAL 10*3/UL

## 2021-08-13 PROCEDURE — 80048 BASIC METABOLIC PNL TOTAL CA: CPT

## 2021-08-13 PROCEDURE — 6370000000 HC RX 637 (ALT 250 FOR IP): Performed by: STUDENT IN AN ORGANIZED HEALTH CARE EDUCATION/TRAINING PROGRAM

## 2021-08-13 PROCEDURE — 36415 COLL VENOUS BLD VENIPUNCTURE: CPT

## 2021-08-13 PROCEDURE — APPNB15 APP NON BILLABLE TIME 0-15 MINS: Performed by: NURSE PRACTITIONER

## 2021-08-13 PROCEDURE — 82947 ASSAY GLUCOSE BLOOD QUANT: CPT

## 2021-08-13 PROCEDURE — 85025 COMPLETE CBC W/AUTO DIFF WBC: CPT

## 2021-08-13 PROCEDURE — 99231 SBSQ HOSP IP/OBS SF/LOW 25: CPT | Performed by: INTERNAL MEDICINE

## 2021-08-13 PROCEDURE — 86140 C-REACTIVE PROTEIN: CPT

## 2021-08-13 RX ORDER — GABAPENTIN 600 MG/1
300 TABLET ORAL EVERY 8 HOURS
Qty: 8 TABLET | Refills: 0 | Status: SHIPPED | OUTPATIENT
Start: 2021-08-13 | End: 2021-08-13

## 2021-08-13 RX ORDER — ACETAMINOPHEN 500 MG
1000 TABLET ORAL 3 TIMES DAILY
Qty: 42 TABLET | Refills: 0 | Status: SHIPPED | OUTPATIENT
Start: 2021-08-13 | End: 2022-08-08

## 2021-08-13 RX ORDER — GABAPENTIN 600 MG/1
300 TABLET ORAL EVERY 8 HOURS
Qty: 8 TABLET | Refills: 0 | Status: SHIPPED | OUTPATIENT
Start: 2021-08-13 | End: 2021-10-13

## 2021-08-13 RX ORDER — IBUPROFEN 600 MG/1
600 TABLET ORAL EVERY 6 HOURS
Qty: 20 TABLET | Refills: 0 | Status: SHIPPED | OUTPATIENT
Start: 2021-08-13 | End: 2022-08-08

## 2021-08-13 RX ORDER — METHOCARBAMOL 750 MG/1
750 TABLET, FILM COATED ORAL EVERY 6 HOURS
Qty: 28 TABLET | Refills: 0 | Status: SHIPPED | OUTPATIENT
Start: 2021-08-13 | End: 2021-08-20

## 2021-08-13 RX ORDER — METHOCARBAMOL 750 MG/1
750 TABLET, FILM COATED ORAL EVERY 6 HOURS
Qty: 28 TABLET | Refills: 0 | Status: SHIPPED | OUTPATIENT
Start: 2021-08-13 | End: 2021-08-13

## 2021-08-13 RX ADMIN — ACETAMINOPHEN 1000 MG: 500 TABLET ORAL at 09:03

## 2021-08-13 RX ADMIN — OXYCODONE HYDROCHLORIDE 5 MG: 5 TABLET ORAL at 14:46

## 2021-08-13 RX ADMIN — METHOCARBAMOL TABLETS 750 MG: 500 TABLET, COATED ORAL at 16:03

## 2021-08-13 RX ADMIN — IBUPROFEN 400 MG: 400 TABLET, FILM COATED ORAL at 03:11

## 2021-08-13 RX ADMIN — GABAPENTIN 300 MG: 600 TABLET ORAL at 09:04

## 2021-08-13 RX ADMIN — METHOCARBAMOL TABLETS 750 MG: 500 TABLET, COATED ORAL at 03:11

## 2021-08-13 RX ADMIN — METHOCARBAMOL TABLETS 750 MG: 500 TABLET, COATED ORAL at 09:04

## 2021-08-13 RX ADMIN — GABAPENTIN 300 MG: 600 TABLET ORAL at 00:38

## 2021-08-13 RX ADMIN — ACETAMINOPHEN 1000 MG: 500 TABLET ORAL at 00:39

## 2021-08-13 RX ADMIN — OXYCODONE HYDROCHLORIDE 5 MG: 5 TABLET ORAL at 00:39

## 2021-08-13 ASSESSMENT — PAIN SCALES - GENERAL
PAINLEVEL_OUTOF10: 8
PAINLEVEL_OUTOF10: 7
PAINLEVEL_OUTOF10: 0
PAINLEVEL_OUTOF10: 8
PAINLEVEL_OUTOF10: 8
PAINLEVEL_OUTOF10: 10
PAINLEVEL_OUTOF10: 8

## 2021-08-13 ASSESSMENT — PAIN DESCRIPTION - PAIN TYPE
TYPE: ACUTE PAIN
TYPE: ACUTE PAIN

## 2021-08-13 ASSESSMENT — PAIN DESCRIPTION - DESCRIPTORS: DESCRIPTORS: HEADACHE

## 2021-08-13 ASSESSMENT — PAIN DESCRIPTION - LOCATION
LOCATION: HEAD
LOCATION: HEAD
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
## 2021-08-13 NOTE — CARE COORDINATION
Dispo planning     Spoke with mother. Reviewed questions regarding neurosurgery, isolation, and sign/symptoms to watch out for. Pt's mother agreed to take pt home. Pro Andrade RN to call mom regarding details of  and clothing.

## 2021-08-13 NOTE — PROGRESS NOTES
PROGRESS NOTE          PATIENT NAME: Anuj Gonzalez [facility]  MEDICAL RECORD NO. [id number]  DATE: 2021  SURGEON: Cris Darby  PRIMARY CARE PHYSICIAN: Dayle Cranker, MD    HD: # [address] Problems         Last Modified POA    Subarachnoid bleed ([facility] 2021 Yes    Traumatic subarachnoid hemorrhage with loss of consciousness of 1 hour to 5 hours 59 minutes ([address] 2021 Yes    Cortex (cerebral) contusion, with loss of consciousness ([address] 2021 Yes    Fracture of occipital bone of skull with loss of consciousness ([facility] 2021 Yes          MEDICAL DECISION MAKING AND PLAN    1. SAH: repeat CT H  stable  2. COVID +: isolation, encourage IS, ambulation, contact precautions  3. Labs reviewed, WBC trending down, hg stable  4. Diet: reg  5. Px control: tylenol, meri, robaxin, prn ray  6. UO: patient ambulating to restroon  7. Consults  1. NS: ok for discharge, will followup with neurosurg in 2 weeks with repeat head CT, no ap/ac  2. ID: no need for rocephin, 10 days isolation for COVID +, no signs of pneumonia on chest xray    8. Dispo: ok to d/c. Will return home with family    Ras 93 has improved since yesterday. Her pain is controlled, she is resting comfortably, tolerating regular diet, and she is ambulating to restroom.  She is pulling 1500 on IS, and has no complaints of cp/sob, n/v.      OBJECTIVE  VITALS: Temp: Temp: 98.9 °F (37.2 °C)Temp  Av.2 °F (37.3 °C)  Min: 98.6 °F (37 °C)  Max: 101.5 °F (68.4 °C) BP Systolic (22XAD), FIK:302 , Min:106 , DFA:667   Diastolic (98XST), BBM:31, Min:60, Max:79   Pulse Pulse  Av.1  Min: 69  Max: 91 Resp Resp  Av.8  Min: 16  Max: 22 Pulse ox SpO2  Av.3 %  Min: 96 %  Max: 98 %  GENERAL: alert, cooperative, no distress  NEURO: no focal deficits  HEENT: Eye: EOMI  : deferred  LUNGS: equal chest rise and fall  HEART: normal rate and regular rhythm  ABDOMEN: soft, non-tender, non-distended, bowel right vertebral artery with termination as the posteroinferior cerebellar artery (PICA). 5. Otherwise, unremarkable CT angiogram of the head and neck. CT CHEST ABDOMEN PELVIS W CONTRAST    Result Date: 8/11/2021  No evidence acute posttraumatic process involving chest/abdomen/pelvis. No evidence acute fracture traumatic malalignment of the thoracic or the lumbar spine     CT LUMBAR SPINE TRAUMA RECONSTRUCTION    Result Date: 8/11/2021  No evidence acute posttraumatic process involving chest/abdomen/pelvis. No evidence acute fracture traumatic malalignment of the thoracic or the lumbar spine     CT THORACIC SPINE TRAUMA RECONSTRUCTION    Result Date: 8/11/2021  No evidence acute posttraumatic process involving chest/abdomen/pelvis.  No evidence acute fracture traumatic malalignment of the thoracic or the lumbar spine         Jeronimo Yanez DO  8/13/21, 1:51 PM

## 2021-08-13 NOTE — PLAN OF CARE
Problem: Gas Exchange - Impaired  Goal: Absence of hypoxia  Outcome: Ongoing  Goal: Promote optimal lung function  Outcome: Ongoing     Problem: Breathing Pattern - Ineffective  Goal: Ability to achieve and maintain a regular respiratory rate  Outcome: Ongoing     Problem: Body Temperature -  Risk of, Imbalanced  Goal: Ability to maintain a body temperature within defined limits  Outcome: Ongoing  Goal: Will regain or maintain usual level of consciousness  Outcome: Ongoing  Goal: Complications related to the disease process, condition or treatment will be avoided or minimized  Outcome: Ongoing     Problem: Isolation Precautions - Risk of Spread of Infection  Goal: Prevent transmission of infection  Outcome: Ongoing     Problem: Nutrition Deficits  Goal: Optimize nutritional status  Outcome: Ongoing     Problem: Risk for Fluid Volume Deficit  Goal: Maintain normal heart rhythm  Outcome: Ongoing  Goal: Maintain absence of muscle cramping  Outcome: Ongoing  Goal: Maintain normal serum potassium, sodium, calcium, phosphorus, and pH  Outcome: Ongoing     Problem: Loneliness or Risk for Loneliness  Goal: Demonstrate positive use of time alone when socialization is not possible  Outcome: Ongoing     Problem: Fatigue  Goal: Verbalize increase energy and improved vitality  Outcome: Ongoing     Problem: Neurological  Goal: Maximum potential motor/sensory/cognitive function  Outcome: Ongoing     Problem: Pain:  Description: Pain management should include both nonpharmacologic and pharmacologic interventions.   Goal: Pain level will decrease  Description: Pain level will decrease  Outcome: Ongoing  Goal: Control of acute pain  Description: Control of acute pain  Outcome: Ongoing  Goal: Control of chronic pain  Description: Control of chronic pain  Outcome: Ongoing     Problem: Skin Integrity:  Goal: Will show no infection signs and symptoms  Description: Will show no infection signs and symptoms  Outcome: Ongoing  Goal: Absence of new skin breakdown  Description: Absence of new skin breakdown  Outcome: Ongoing

## 2021-08-14 ASSESSMENT — ENCOUNTER SYMPTOMS
VOMITING: 0
COUGH: 0
DIARRHEA: 0
WHEEZING: 0
NAUSEA: 0
SORE THROAT: 0

## 2021-08-14 NOTE — PROGRESS NOTES
Patients family arrived with patient belongings. Patient was given discharge instructions from RN and is aware of any appointments that need to be made and any medications that she needs to take. RN answered all questions and concerns of the pt and family. RN removed both IVs and wheeled patient to her car with all belongings.

## 2021-08-16 ENCOUNTER — TELEPHONE (OUTPATIENT)
Dept: NEUROSURGERY | Age: 26
End: 2021-08-16

## 2021-08-16 ENCOUNTER — CARE COORDINATION (OUTPATIENT)
Dept: CASE MANAGEMENT | Age: 26
End: 2021-08-16

## 2021-08-16 NOTE — TELEPHONE ENCOUNTER
Patient's mother called in stating patient is in horrible pain and she is taking NEURONTIN 600 and Tylenol as needed and nothing seems to be working. Medications seem to be wearing off before it's time to take again.
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
## 2021-08-17 ENCOUNTER — VIRTUAL VISIT (OUTPATIENT)
Dept: NEUROSURGERY | Age: 26
End: 2021-08-17
Payer: COMMERCIAL

## 2021-08-17 DIAGNOSIS — I60.9 SUBARACHNOID BLEED (HCC): ICD-10-CM

## 2021-08-17 DIAGNOSIS — S02.119A FRACTURE OF OCCIPITAL BONE OF SKULL WITH LOSS OF CONSCIOUSNESS (HCC): ICD-10-CM

## 2021-08-17 DIAGNOSIS — S06.9X9A FRACTURE OF OCCIPITAL BONE OF SKULL WITH LOSS OF CONSCIOUSNESS (HCC): ICD-10-CM

## 2021-08-17 DIAGNOSIS — S06.2X9D CONTUSION OF CEREBRAL CORTEX WITH LOSS OF CONSCIOUSNESS, SUBSEQUENT ENCOUNTER: ICD-10-CM

## 2021-08-17 DIAGNOSIS — S06.6X3D TRAUMATIC SUBARACHNOID HEMORRHAGE WITH LOSS OF CONSCIOUSNESS OF 1 HOUR TO 5 HOURS 59 MINUTES, SUBSEQUENT ENCOUNTER: Primary | ICD-10-CM

## 2021-08-17 PROCEDURE — 99213 OFFICE O/P EST LOW 20 MIN: CPT | Performed by: NURSE PRACTITIONER
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
## 2021-08-18 NOTE — TELEPHONE ENCOUNTER
Patient was told to take Advil only for 5 days and needs to know if she is to stop Advil and only take Tylenol or continue with Advil after the 5 days?  Seems very confused on medications she should be taking

## 2021-08-19 ENCOUNTER — CARE COORDINATION (OUTPATIENT)
Dept: CASE MANAGEMENT | Age: 26
End: 2021-08-19

## 2021-08-20 ENCOUNTER — CARE COORDINATION (OUTPATIENT)
Dept: CASE MANAGEMENT | Age: 26
End: 2021-08-20

## 2021-08-20 NOTE — CARE COORDINATION
[facility] Transitions Follow Up Call    2021    Patient: Humaira Beck  Patient : 1995   MRN: <[id number]>  Reason for Admission:   Discharge Date: 21 RARS: Readmission Risk Score: 5         Spoke with: Subsequent call. Second and final attempt. No answer. Unable to leave voicemail  Mailbox is full. Care Transitions Subsequent and Final Call    Subsequent and Final Calls  Care Transitions Interventions    Pharmacist: Declined    Other Interventions:            Follow Up  Future Appointments   Date Time Provider Matthew Guo   2021  1:00 PM [facility] CT ROOM [facility] CT SCAN [facility] [place]   2021  2:30 PM Lonnell Kussmaul, APRN - CNP [facility]   2021  8:20 AM Paula Vargas MD Neuro Spec [address] Care Transitions Nurse   [phone]

## 2021-08-20 NOTE — CARE COORDINATION
[facility] Transitions Follow Up Call    2021    Patient: Ronda Germain  Patient : 1995   MRN: <[id number]>  Reason for Admission: Traumatic subarachnoid hemorrhage with loss of consciousness  Discharge Date: 21 RARS: Readmission Risk Score: 5         Spoke with: Subsequent call. Second and final attempt. No answer. Unable to leave voicemail. Care Transitions Subsequent and Final Call    Subsequent and Final Calls  Care Transitions Interventions    Pharmacist: Declined    Other Interventions:            Follow Up  Future Appointments   Date Time Provider Matthew Guo   2021  1:00 PM [facility] CT ROOM [facility] CT SCAN [facility] [place]   2021  2:30 PM FLAVIO Smith - CNP [facility]   2021  8:20 AM Delfin Crouch MD Neuro Spec [address] Care Transitions Nurse   [phone]

## 2021-08-30 ENCOUNTER — HOSPITAL ENCOUNTER (OUTPATIENT)
Dept: CT IMAGING | Age: 26
Discharge: HOME | End: 2021-09-01
Payer: COMMERCIAL

## 2021-08-30 DIAGNOSIS — I60.9 SAH (SUBARACHNOID HEMORRHAGE) (HCC): ICD-10-CM

## 2021-08-30 PROCEDURE — 70450 CT HEAD/BRAIN W/O DYE: CPT

## 2021-09-08 ENCOUNTER — OFFICE VISIT (OUTPATIENT)
Dept: NEUROSURGERY | Age: 26
End: 2021-09-08
Payer: COMMERCIAL

## 2021-09-08 VITALS
HEIGHT: 67 IN | HEART RATE: 65 BPM | SYSTOLIC BLOOD PRESSURE: 109 MMHG | DIASTOLIC BLOOD PRESSURE: 71 MMHG | WEIGHT: 193 LBS | BODY MASS INDEX: 30.29 KG/M2

## 2021-09-08 DIAGNOSIS — I60.9 SUBARACHNOID BLEED (HCC): ICD-10-CM

## 2021-09-08 DIAGNOSIS — G44.309 POST-CONCUSSION HEADACHE: ICD-10-CM

## 2021-09-08 DIAGNOSIS — S06.9X2D TRAUMATIC BRAIN INJURY, WITH LOSS OF CONSCIOUSNESS OF 31 MINUTES TO 59 MINUTES, SUBSEQUENT ENCOUNTER: ICD-10-CM

## 2021-09-08 DIAGNOSIS — G44.319 ACUTE POST-TRAUMATIC HEADACHE, NOT INTRACTABLE: ICD-10-CM

## 2021-09-08 DIAGNOSIS — S06.6X3D TRAUMATIC SUBARACHNOID HEMORRHAGE WITH LOSS OF CONSCIOUSNESS OF 1 HOUR TO 5 HOURS 59 MINUTES, SUBSEQUENT ENCOUNTER: ICD-10-CM

## 2021-09-08 DIAGNOSIS — S06.2X9D CONTUSION OF CEREBRAL CORTEX WITH LOSS OF CONSCIOUSNESS, SUBSEQUENT ENCOUNTER: Primary | ICD-10-CM

## 2021-09-08 PROCEDURE — 99214 OFFICE O/P EST MOD 30 MIN: CPT | Performed by: NURSE PRACTITIONER
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
## 2021-09-08 NOTE — PATIENT INSTRUCTIONS
Patient Education        Learning About a Closed Head Injury  What is a closed head injury? A closed head injury happens when your head gets hit hard. The strong force of the blow causes your brain to shake in your skull. This movement can cause the brain to bruise, swell, or tear. Sometimes nerves or blood vessels also get damaged. This can cause bleeding in or around the brain. A concussion is a type of closed head injury. What are the symptoms? If you have a mild concussion, you may have a mild headache or feel \"not quite right. \" These symptoms are common. They usually go away over a few days to 4 weeks. But sometimes after a concussion, you feel like you can't function as well as before the injury. And you have new symptoms. This is called postconcussive syndrome. You may:  · Find it harder to solve problems, think, concentrate, or remember. · Have headaches. · Have changes in your sleep patterns, such as not being able to sleep or sleeping all the time. · Have changes in your personality. · Not be interested in your usual activities. · Feel angry or anxious without a clear reason. · Lose your sense of taste or smell. · Be dizzy, lightheaded, or unsteady. It may be hard to stand or walk. How is a closed head injury treated? Any person who may have a concussion needs to see a doctor. Some people have to stay in the hospital to be watched. Others can go home safely. If you go home, follow your doctor's instructions. He or she will tell you if you need someone to watch you closely for the next 24 hours or longer. Rest is the best treatment. Get plenty of sleep at night. And try to rest during the day. · Avoid activities that are physically or mentally demanding. These include housework, exercise, and schoolwork. And don't play video games, send text messages, or use the computer. You may need to change your school or work schedule to be able to avoid these activities.   · Ask your doctor when it's okay to drive, ride a bike, or operate machinery. · Take an over-the-counter pain medicine, such as acetaminophen (Tylenol), ibuprofen (Advil, Motrin), or naproxen (Aleve). Be safe with medicines. Read and follow all instructions on the label. · Check with your doctor before you use any other medicines for pain. · Do not drink alcohol or use illegal drugs. They can slow recovery. They can also increase your risk of getting a second head injury. Follow-up care is a key part of your treatment and safety. Be sure to make and go to all appointments, and call your doctor if you are having problems. It's also a good idea to know your test results and keep a list of the medicines you take. Where can you learn more? Go to https://[employer].MetroMile. org and sign in to your SpeechVive account. Enter 60 [id number] in the SplashMaps box to learn more about \"Learning About a Closed Head Injury. \"     If you do not have an account, please click on the \"Sign Up Now\" link. Current as of: August 4, 2020               Content Version: 12.9  © 2556-6880 [employer]. Care instructions adapted under license by [facility] ([facility]). If you have questions about a medical condition or this instruction, always ask your healthcare professional. [place] [address] any warranty or liability for your use of this information. Patient Education        Postconcussion Syndrome: Care Instructions  Your Care Instructions     Postconcussion syndrome occurs after a blow to the head or body. Common symptoms are changes in the ability to concentrate, think, remember, or solve problems. Symptoms, which may include headaches, personality changes, and dizziness, may be related to stress from the events surrounding the accident that caused the injury. Follow-up care is a key part of your treatment and safety. Be sure to make and go to all appointments, and call your doctor if you are having problems. It's also a good idea to know your test results and keep a list of the medicines you take. How can you care for yourself at home? Pain   · Rest is the best treatment for postconcussion syndrome. · Do not drive if you have taken a prescription pain medicine. · Rest in a quiet, dark room until your headache is gone. Close your eyes and try to relax or go to sleep. Do not watch TV or read. · Put a cold, moist cloth or cold pack on the painful area for 10 to 20 minutes at a time. Put a thin cloth between the cold pack and your skin. · Have someone gently massage your neck and shoulders. · Take your medicines exactly as prescribed. Call your doctor if you think you are having a problem with your medicine. You will get more details on the specific medicines your doctor prescribes. Stress   · Try to reduce stress. Some ways to do this include:  ? Taking slow, deep breaths. ? Soaking in a warm bath. ? Listening to soothing music. ? Having a massage or back rub. ? Drinking a warm, nonalcoholic, noncaffeinated beverage. · Get enough sleep. · Eat a healthy, balanced diet. A balanced diet includes whole grains, dairy, fruits and vegetables, and protein. Eat a variety of foods from each of those groups so you get all the nutrients you need. · Avoid alcohol and illegal drugs. · Try relaxation exercises, such as breathing and muscle relaxation exercises. · Talk to your doctor about counseling. It may help you deal with stress from your accident. When should you call for help? Watch closely for changes in your health, and be sure to contact your doctor if:    · You do not get better as expected.     · Your symptoms, such as headaches, trouble concentrating, or changes in mood, get worse. Where can you learn more? Go to https://mary.Craneware. org and sign in to your BumpTop account. Enter G918 in the Kappa Prime box to learn more about \"Postconcussion Syndrome: Care Instructions. \"

## 2021-09-09 ENCOUNTER — TELEPHONE (OUTPATIENT)
Dept: NEUROSURGERY | Age: 26
End: 2021-09-09

## 2021-09-09 NOTE — TELEPHONE ENCOUNTER
Patient called stating she is suppose to start a recovery drinking 3 day class but cannot.  Patient requesting a letter to get her out of class

## 2021-09-10 ENCOUNTER — TELEPHONE (OUTPATIENT)
Dept: NEUROSURGERY | Age: 26
End: 2021-09-10

## 2021-09-10 NOTE — TELEPHONE ENCOUNTER
[facility] Perales is asking approximately how long will patient be exempt from recovery, so they can prepare document for future recovery? Program consist of group, watching movie and book work. No physical activity.  Please call back

## 2021-09-10 NOTE — TELEPHONE ENCOUNTER
Letter generated and faxed over. Patient informed. Faxed to [phone]  email to [email or web address]

## 2021-09-10 NOTE — TELEPHONE ENCOUNTER
Patient has upcoming appointment with neurology-this will ultimately be up to them to return to activities/work. Yes, record another set of vital signs

## 2021-10-13 ENCOUNTER — OFFICE VISIT (OUTPATIENT)
Dept: NEUROLOGY | Age: 26
End: 2021-10-13
Payer: COMMERCIAL

## 2021-10-13 VITALS
BODY MASS INDEX: 28.88 KG/M2 | SYSTOLIC BLOOD PRESSURE: 130 MMHG | DIASTOLIC BLOOD PRESSURE: 84 MMHG | HEART RATE: 81 BPM | OXYGEN SATURATION: 96 % | WEIGHT: 184 LBS | HEIGHT: 67 IN

## 2021-10-13 DIAGNOSIS — R41.3 MEMORY PROBLEM: ICD-10-CM

## 2021-10-13 DIAGNOSIS — S06.6X3S: ICD-10-CM

## 2021-10-13 DIAGNOSIS — S06.9X9A FRACTURE OF OCCIPITAL BONE OF SKULL WITH LOSS OF CONSCIOUSNESS (HCC): ICD-10-CM

## 2021-10-13 DIAGNOSIS — G44.309 POST-CONCUSSION HEADACHE: Primary | ICD-10-CM

## 2021-10-13 DIAGNOSIS — S06.2X9S: ICD-10-CM

## 2021-10-13 DIAGNOSIS — S02.119A FRACTURE OF OCCIPITAL BONE OF SKULL WITH LOSS OF CONSCIOUSNESS (HCC): ICD-10-CM

## 2021-10-13 DIAGNOSIS — G93.89 ENCEPHALOMALACIA ON IMAGING STUDY: ICD-10-CM

## 2021-10-13 DIAGNOSIS — I60.9 SUBARACHNOID BLEED (HCC): ICD-10-CM

## 2021-10-13 PROCEDURE — 99214 OFFICE O/P EST MOD 30 MIN: CPT | Performed by: PSYCHIATRY & NEUROLOGY

## 2021-10-13 PROCEDURE — 99205 OFFICE O/P NEW HI 60 MIN: CPT | Performed by: PSYCHIATRY & NEUROLOGY

## 2021-10-13 ASSESSMENT — ENCOUNTER SYMPTOMS
SORE THROAT: 0
WHEEZING: 0
CHOKING: 0
PHOTOPHOBIA: 0
APNEA: 0
EYE PAIN: 0
ABDOMINAL DISTENTION: 0
COUGH: 0
CHEST TIGHTNESS: 0
BLOOD IN STOOL: 0
EYE REDNESS: 0
EYE DISCHARGE: 0
ABDOMINAL PAIN: 0
SHORTNESS OF BREATH: 0
BACK PAIN: 0
EYE ITCHING: 0
BOWEL INCONTINENCE: 0
VOICE CHANGE: 0
CONSTIPATION: 0
FACIAL SWELLING: 0
DIARRHEA: 0
NAUSEA: 0
VOMITING: 0
TROUBLE SWALLOWING: 0
VISUAL CHANGE: 0
SINUS PRESSURE: 0
COLOR CHANGE: 0

## 2021-10-13 NOTE — PROGRESS NOTES
This writer contacted scheduling to schedule the following testing: EEG and CT head wo contrast - patient made aware, will contact office with further needs.

## 2021-10-13 NOTE — PROGRESS NOTES
[facility]  Neurology    [address] [address]  YRWYR:[phone]   Fax: [phone]        SUBJECTIVE:       PATIENT ID:  Andrez Sen is a  RIGHT   HANDED 31 y.o. female. Neurologic Problem  The patient's primary symptoms include memory loss. The patient's pertinent negatives include no altered mental status, clumsiness, focal sensory loss, focal weakness, loss of balance, near-syncope, slurred speech, syncope, visual change or weakness. Primary symptoms comment: POST  CONCUSSION  HEADACHES  AND  MEMORY PROBLEMS  . This is a recurrent problem. Episode onset: [facility] INJURY   DUE  TO  FALL    IN   AUG  2021. The neurological problem developed suddenly. The problem has been waxing and waning since onset. There was no focality noted. Associated symptoms include headaches. Pertinent negatives include no abdominal pain, auditory change, aura, back pain, bladder incontinence, bowel incontinence, chest pain, confusion, diaphoresis, dizziness, fatigue, fever, light-headedness, nausea, neck pain, palpitations, shortness of breath, vertigo or vomiting. Past treatments include bed rest, sleep and medication. The treatment provided no relief. Her past medical history is significant for head trauma. There is no history of a bleeding disorder, a clotting disorder, a CVA, dementia, liver disease, mood changes or seizures. History obtained from  The   [address] [place] [address]       and other  available   medical  records   were  Also  reviewed. The  Duration,  Quality,  Severity,  Location,  Timing,  Context,  Modifying  Factors   Of   The   Chief   Complaint       And  Present  Illness  Was   Reviewed   In   Chronological   [place]anner.                                             PATIENT'S  MAIN  CONCERNS INCLUDE :                       1)    H/O    TRAUMATIC    HEAD  INJURY     DUE    FALL  FROM                                  GOLF CART     WHILE  INTOXICATED      IN   AUG.  2021                            2)     CT    HEAD   8/11/2021      SHOWED  :                               A)   BIFRONTAL  CONTUSIONS                              B)   TRAUMATIC    SUBARACHNOID    HEMORRHAGE                               C)      RIGHT  OCCIPITAL    FRACTURE   NON  DISPLACED                                   PATIENT    WAS    HOSPITALIZED     WITH   ETOH  LEVEL   OF   50                                   MANAGED     NON  SURGICALLY     BY  NEUROSURGERY                                  AND  DISCHARGED     ON    8/13/2021                         3)     H/O    RETROGRADE     AND   ANTEGRADE    AMNESIA                                  FOR     TRAUMATIC  BRAIN INJURY                          4)      CT   HEAD     ON   8/30/2021       SHOWED                               A)    RESOLVED     FRONTAL   SUBDURAL   HEMATOMA                                           AND    SUBARACHNOID    HEMORRHAGE                               B)    ENCEPHALOMALACIA    BILATERAL   INFERIOR  FRONTAL  GYRUS                                           CONSISTENT     WITH    PRIOR  CONTUSION                                  ALSO   HAD  NEURO  SURGERY   FOLLOW  UP   EVALUATIONS                                 -   ABOVE     RECORDS,    REPORTS      REVIEWED  IN  DETAIL.                         5)     H/O        POST    CONCUSSION    HEAD ACHES                                      CONTINUING   MOSTLY  IN   THE  FRONTAL   AREAS                                    -  ON  TYLENOL ,   IBUPROFEN     AS  NEEDED                        6)       POST  CONCUSSION     SYNDROME     WITH                                  MEMORY    AND    ATTENTION  PROBLEMS                        7)    PATIENT  INCREASED   RISK  FOR                                   POST  TRUAMATIC     SEIZURE  ACTIVITY                                                           8)   IN  VIEW  OF  THE  PATIENT'S    MULTIPLE   NEUROLOGICAL SYMPTOMS  AND  CONCERNS    FOR  PROLONGED   DURATION,                            PATIENT    NEEDS  NEURO  DIAGNOSTIC  EVALUATIONS                      FOR   ANY   NEUROLOGICAL  PATHOLOGIES    AND  OTHER                        CORRECTABLE   ETIOLOGIES;     AND                              PATIENT  REQUESTS   THE  SAME. 9)       VARIOUS  RISK   FACTORS   WERE  REVIEWED   AND   DISCUSSED. PATIENT   HAS  MULTIPLE   MEDICAL, NEUROLOGICAL   PROBLEMS . PATIENT'S   MANAGEMENT  IS  CHALLENGING.                                         PRECIPITATING  FACTORS: including  fever/infection, exertion/relaxation, position change, stress,                weather change,   medications/alcohol, time of day/darkness/light  Are  absent                                                          MODIFYING  FACTORS:  fever/infection, exertion/relaxation, position change, stress, weather change,               medications/alcohol, time of day/darkness/light  Are  absent                Patient   Indicates   The  Presence   And  The  Absence  Of  The  Following    Associated  And             Additional  Neurological    Symptoms:                                Balance  And coordination   problems  absent           Gait problems     absent            Headaches      present              Migraines           absent           Memory problemspresent              Confusion        absent            Paresthesia   numbness          absent           Seizures  And  Starring  Episodes           absent           Syncope,  Near  syncopal episodes         absent           Speech   problems           absent             Swallowing   Problems      absent            Dizziness,  Light headedness           absent              Vertigo        absent             Generalized   Weakness    absent              focal  Weakness     absent             Tremors         absent Sleep  Problems     absent             History  Of   Recent  Head  Injury     absent             History  Of   Recent  TIA     absent             History  Of   Recent    Stroke     absent             Neck  Pain   and   Neck muscle  Spasms  absent               Radiating  down   And   Weakness           absent            Lower back   Pain  And     Spasms  absent              Radiating    Down   And   Weakness          absent                H/OFALLS        absent               History  Of   Visual  Symptoms    absent                  Associated   Diplopia       absent                                               Also   Additional   Symptoms   Present    As  Documented    In   The   detailed                  Review  Of  Systems   And    Please   Refer   To    Them for   Additional    Information. Any components  That are either  Unobtainable  Or  Limited  In   HPI, ROS  And/or PFSH   Are                   Due   ToPatient's  Medical  Problems,  Clinical  Condition   and/or lack of                                 other    Alternate   resources. RECORDS   REVIEWED:    historical medical records           INFORMATION   REVIEWED:     MEDICAL   HISTORY,SURGICAL   HISTORY,     MEDICATIONS   LIST,   ALLERGIES AND  DRUG  INTOLERANCES,       FAMILY   HISTORY,  SOCIAL  HISTORY,      PROBLEM  LIST   FOR  PATIENT  CARE   COORDINATION          History reviewed. No pertinent past medical history. History reviewed. No pertinent surgical history. Current Outpatient Medications   Medication Sig Dispense Refill    ibuprofen (ADVIL;MOTRIN) 600 MG tablet Take 1 tablet by mouth every 6 hours for 5 days 20 tablet 0    acetaminophen (TYLENOL) 500 MG tablet Take 2 tablets by mouth 3 times daily for 7 days 42 tablet 0     No current facility-administered medications for this visit.          Allergies   Allergen Reactions    Augmentin [Amoxicillin-Pot Clavulanate] Rash     As a child, unsure of reaction History reviewed. No pertinent family history. Social History     Socioeconomic History    Marital status: Single     Spouse name: Not on file    Number of children: Not on file    Years of education: Not on file    Highest education level: Not on file   Occupational History    Not on file   Tobacco Use    Smoking status: Never Smoker    Smokeless tobacco: Never Used   Substance and Sexual Activity    Alcohol use: No    Drug use: No    Sexual activity: Not on file   Other Topics Concern    Not on file   Social History Narrative    Not on file     Social Determinants of Health     Financial Resource Strain:     Difficulty of Paying Living Expenses:    Food Insecurity:     Worried About Running Out of Food in the Last Year:     [address] in the Last Year:    Transportation Needs:     Lack of Transportation (Medical):      Lack of Transportation (Non-Medical):    Physical Activity:     Days of Exercise per Week:     Minutes of Exercise per Session:    Stress:     Feeling of Stress :    Social Connections:     Frequency of Communication with Friends and Family:     Frequency of Social Gatherings with Friends and Family:     Attends Scientologist Services:     Active Member of Clubs or Organizations:     Attends Club or Organization Meetings:     Marital Status:    Intimate Partner Violence:     Fear of Current or Ex-Partner:     Emotionally Abused:     Physically Abused:     Sexually Abused:        Vitals:    10/13/21 1514   BP: 130/84   Pulse: 81   SpO2: 96%         Wt Readings from Last 3 Encounters:   10/13/21 184 lb (83.5 kg)   09/08/21 193 lb (87.5 kg)   08/12/21 193 lb 5.5 oz (87.7 kg)         BP Readings from Last 3 Encounters:   10/13/21 130/84   09/08/21 109/71   08/13/21 131/74           Hematology and Coagulation    Lab Results   Component Value Date    WBC 14.4 08/13/2021    RBC 3.67 08/13/2021    HGB 12.5 08/13/2021    HCT 38.2 08/13/2021    .1 08/13/2021 MCH 34.1 08/13/2021    MCHC 32.7 08/13/2021    RDW 10.9 08/13/2021     08/13/2021    MPV 10.0 08/13/2021           Chemistries    Lab Results   Component Value Date     08/13/2021    K 4.5 08/13/2021     08/13/2021    CO2 19 08/13/2021    BUN 6 08/13/2021    CREATININE 0.46 08/13/2021    CALCIUM 8.9 08/13/2021       Lab Results   Component Value Date    BUN 6 08/13/2021    CREATININE 0.46 08/13/2021     Lab Results   Component Value Date    CALCIUM 8.9 08/13/2021     . Review of Systems   Constitutional: Negative for appetite change, chills, diaphoresis, fatigue, fever and unexpected weight change. HENT: Negative for congestion, dental problem, drooling, ear discharge, ear pain, facial swelling, hearing loss, mouth sores, nosebleeds, postnasal drip, sinus pressure, sore throat, tinnitus, trouble swallowing and voice change. Eyes: Negative for photophobia, pain, discharge, redness, itching and visual disturbance. Respiratory: Negative for apnea, cough, choking, chest tightness, shortness of breath and wheezing. Cardiovascular: Negative for chest pain, palpitations, leg swelling and near-syncope. Gastrointestinal: Negative for abdominal distention, abdominal pain, blood in stool, bowel incontinence, constipation, diarrhea, nausea and vomiting. Endocrine: Negative for cold intolerance, heat intolerance, polydipsia, polyphagia and polyuria. Genitourinary: Negative for bladder incontinence. Musculoskeletal: Negative for arthralgias, back pain, gait problem, joint swelling, myalgias, neck pain and neck stiffness. Skin: Negative for color change, pallor, rash and wound. Allergic/Immunologic: Negative for environmental allergies, food allergies and immunocompromised state. Neurological: Positive for headaches.  Negative for dizziness, vertigo, tremors, focal weakness, seizures, syncope, facial asymmetry, speech difficulty, weakness, light-headedness, numbness and loss of balance. Hematological: Negative for adenopathy. Does not bruise/bleed easily. Psychiatric/Behavioral: Positive for decreased concentration and memory loss. Negative for agitation, behavioral problems, confusion, dysphoric mood, hallucinations, self-injury, sleep disturbance and suicidal ideas. The patient is not nervous/anxious and is not hyperactive. OBJECTIVE:      Physical Exam  Constitutional:       Appearance: She is well-developed. HENT:      Head: Normocephalic and atraumatic. No raccoon eyes or Thomas's sign. Right Ear: External ear normal.      Left Ear: External ear normal.      Nose: Nose normal.   Eyes:      Conjunctiva/sclera: Conjunctivae normal.      Pupils: Pupils are equal, round, and reactive to light. Neck:      Thyroid: No thyroid mass or thyromegaly. Vascular: No carotid bruit. Trachea: No tracheal deviation. Meningeal: Brudzinski's sign and Kernig's sign absent. Cardiovascular:      Rate and Rhythm: Normal rate and regular rhythm. Pulmonary:      Effort: Pulmonary effort is normal.   Musculoskeletal:         General: No tenderness. Normal range of motion. Cervical back: Normal range of motion and neck supple. No rigidity. No muscular tenderness. Normal range of motion. Skin:     General: Skin is warm. Coloration: Skin is not pale. Findings: No erythema or rash. Nails: There is no clubbing. Psychiatric:         Attention and Perception: She is attentive. Mood and Affect: Mood is not anxious or depressed. Affect is not labile, blunt or inappropriate. Speech: She is communicative. Speech is not rapid and pressured, delayed, slurred or tangential.         Behavior: Behavior is not agitated, slowed, aggressive, withdrawn, hyperactive or combative. Behavior is cooperative. Thought Content: Thought content is not paranoid or delusional. Thought content does not include homicidal or suicidal ideation.  Thought content does not include homicidal or suicidal plan. Cognition and Memory: Cognition is impaired. She exhibits impaired recent memory. She does not exhibit impaired remote memory. Judgment: Judgment is not impulsive or inappropriate. NEUROLOGICALEXAMINATION :       A) MENTAL STATUS:                   Alert and  oriented  To time, place  And  Person. No Aphasia. No  Dysarthria. Able   To  Follow     SIMPLE    commands   without   Any  Difficulty. No right  To left confusion. Normal  Speech  And language function. Insight and  Judgment ,Fund  Of  Knowledge   within normal limits                Recent  And  Remote memory   DECREASED                Attention &  Concentration are within   normal limits                                                   B) CRANIAL NERVES :        CN : Visual  Acuity  And  Visual fields  within normal limits               Fundi  Could  Not  Be  Could  Not  Be  Evaluated. 3,4,6 CN : Both  Pupils are   Reactive and  Equal.  Movements  Are  Intact. No  Nystagmus. No  KYLE. No  Afferent  Pupillary  Defect noted. 5 CN :  Normal  Facial sensations and Corneal  Reflexes           7 CN:  Normal  Facial  Symmetry  And  Strength. No facial  Weakness. 8 CN :  Hearing  Appears within normal limits          9, 10 CN: Normal   spontaneous, reflex   palate   movements         11 CN:   Normal  Shoulder  shrug and  strength         12 CN :   Normal  Tongue movements and  Tongue  In midline                        No tongue   Fasciculations or atrophy       C) MOTOR  EXAM:                 Strength  In upper  And  Lower   extremities   within   normal limits               No  Drift.      No  Atrophy               Rapid   alternating  And  repetitions  Movements  within   normal limits                 Muscle  Tone  In upper  And  Lower Extremities  normal                No rigidity. No  Spasticity. Bradykinesia   absent                 No  Asterixis. Sustention  Tremor , Resting   Tremor   absent                    No   other  Abnormal  Movements noted           D) SENSORY :               Light   touch, pinprick,   position  And  Vibration  within normal limits        E) REFLEXES:                   Deep  Tendon  Reflexes   normal                  No  pathological  Reflexes  Bilaterally.                                   F) COORDINATION  AND  GAIT :                                Station and  Gait  normal                              Romberg 's test negative                          Ataxia negative        ASSESSMENT:      Patient Active Problem List   Diagnosis    Subarachnoid bleed (HCC)    Traumatic subarachnoid hemorrhage with loss of consciousness of 1 hour to 5 hours 59 minutes (HCC)    Cortex (cerebral) contusion, with loss of consciousness (HCC)    Fracture of occipital bone of skull with loss of consciousness [facility] [address]    Encephalomalacia on imaging study    Post-concussion headache    Memory problem         CT OF THE HEAD WITHOUT CONTRAST  8/30/2021 1:05 pm       TECHNIQUE:   CT of the head was performed without the administration of intravenous   contrast. Dose modulation, iterative reconstruction, and/or weight based   adjustment of the mA/kV was utilized to reduce the radiation dose to as low   as reasonably achievable.       COMPARISON:   08/12/2021       HISTORY:   ORDERING SYSTEM PROVIDED HISTORY: SAH (subarachnoid hemorrhage) ([address]   TECHNOLOGIST PROVIDED HISTORY:   Is the patient pregnant?->No   Reason for Exam: F/u subarachnoid hemorrhage from 8/11/21; no complaints at   this time   Acuity: Acute   Type of Exam: Subsequent/Follow-up       FINDINGS:   Findings:       Previously noted foci of subarachnoid hemorrhage and frontal subdural   hematoma have resolved. Louanna Oh is extensive encephalomalacia involving the   anterior aspect of bilateral  inferior frontal gyrus, consistent with brain   contusion which also contains scattered areas of subtle hyperdensity likely   sequela to prior previously noted foci of intraparenchymal hemorrhage.       There is no acute infarction or intracranial mass lesion. No mass effect,   midline shift or extra-axial collection is noted.       The brain parenchyma is otherwise normal.  The cerebellar tonsils are in   normal position.       The ventricles, sulci, and cisterns are within normal limits in size and   configuration.       The globes and orbits are within normal limits. The visualized extracranial   structures including paranasal sinuses and mastoid air cells are   unremarkable. No fracture is identified.           Impression       Previously noted foci of subarachnoid hemorrhage and frontal subdural   hematoma have resolved. There is extensive encephalomalacia involving the   anterior aspect of bilateral inferior frontal gyrus, consistent with brain   contusion which also contain scattered areas of subtle hyperdensity likely   sequela to prior previously noted foci of intraparenchymal hemorrhage.       No evidence for acute territorial infarction or intracranial mass lesion.          CT OF THE HEAD WITHOUT CONTRAST  8/11/2021 10:25 pm       TECHNIQUE:   CT of the head was performed without the administration of intravenous   contrast. Dose modulation, iterative reconstruction, and/or weight based   adjustment of the mA/kV was utilized to reduce the radiation dose to as low   as reasonably achievable.       COMPARISON:   None.       HISTORY:   ORDERING SYSTEM PROVIDED HISTORY: fall; [address]   TECHNOLOGIST PROVIDED HISTORY:   Perform at 10pm (2200) please   fall; [address]       Decision Support Exception - unselect if not a suspected or confirmed   emergency medical condition->Emergency Medical Condition (MA)   Is the patient pregnant?->No   Reason for Exam: fall; SAH   Acuity: Acute   Type of Exam: Initial       FINDINGS:   BRAIN/VENTRICLES: There foci hemorrhagic contusion bilateral anterior   inferior frontal lobes with areas of surrounding edema.  Largest parenchymal   hemorrhage in the right measure up to 1.6 x 1.7 cm in size.  Hyperdense   thickening along the interhemispheric falx anteriorly measure up to 4 mm in   thickness.  There is minimal hyperdense thickening identified along the   anterior interhemispheric falx and.  There is mild subarachnoid hemorrhage   identified both anterior frontal lobes greatest on left.  Minimal right mild   subarachnoid hemorrhage identified along parasagittal frontal parietal   regions no shift of midline structure.  Basal cisterns are patent.       ORBITS: The visualized portion of the orbits demonstrate no acute abnormality.       SINUSES: Paranasal sinus identified.  Air-fluid level identified both   maxillary sinuses.  Moderate ethmoid sinus mucosal thickening.  Mastoids   grossly clear.       SOFT TISSUES/SKULL:  Right occipital bone fracture nondisplaced.           Impression   Mild subarachnoid hemorrhage and areas of hemorrhagic contusion both anterior   frontal lobes.       Right occipital bone fracture, nondisplaced       Critical results were called by Dr. Varsha Atwood to Dr. David Camarena on 8/11/2021   at 23:47. CT OF THE CERVICAL SPINE WITHOUT CONTRAST 8/11/2021 10:25 pm       TECHNIQUE:   CT of the cervical spine was performed without the administration of   intravenous contrast. Multiplanar reformatted images are provided for review.    Dose modulation, iterative reconstruction, and/or weight based adjustment of   the mA/kV was utilized to reduce the radiation dose to as low as reasonably   achievable.       COMPARISON:   None.       HISTORY:   ORDERING SYSTEM PROVIDED HISTORY: Trauma   TECHNOLOGIST PROVIDED HISTORY:   Trauma   Is the patient pregnant?->No   Reason for Exam: Trauma   Acuity: Acute   Type of Exam: Initial     FINDINGS:   BONES/ALIGNMENT: There is no acute fracture or traumatic malalignment of the   cervical spine.  There is a right nondisplaced occipital bone fracture       DEGENERATIVE CHANGES: No significant degenerative changes.       SOFT TISSUES: There is no prevertebral soft tissue swelling.           Impression   No acute fracture traumatic malalignment of the cervical spine.       Nondisplaced occipital bone fracture again identified.             VISITING DIAGNOSIS:      ICD-10-CM    1. Post-concussion headache  G44.309    2. Traumatic subarachnoid hemorrhage with loss of consciousness of 1 hour to 5 hours 59 minutes, sequela (HCC)  S06. 6X3S EEG     CBC     Electrolyte Panel     TSH     Vitamin B12 & Folate     CT HEAD WO CONTRAST   3. Subarachnoid bleed (HCC)  I60.9 EEG     CBC     Electrolyte Panel     TSH     Vitamin B12 & Folate     CT HEAD WO CONTRAST   4. Fracture of occipital bone of skull with loss of consciousness (HCC)  S02.119A EEG    S06.9X9A CBC     Electrolyte Panel     TSH     Vitamin B12 & Folate     CT HEAD WO CONTRAST   5. Contusion of cerebral cortex with loss of consciousness, sequela ([facility] [address]  S06.2X9S EEG     CBC     Electrolyte Panel     TSH     Vitamin B12 & Folate     CT HEAD WO CONTRAST   6. Encephalomalacia on imaging study  G93.89 EEG     CBC     Electrolyte Panel     TSH     Vitamin B12 & Folate     CT HEAD WO CONTRAST   7. Memory problem  R41.3                 CONCERNS   &   INCREASED   RISK   FOR        *  SEIZURE  ACTIVITY,      *     MIGRAINES  AND   TENSION  HEADACHES      *   COGNITIVE  &   MEMORY PROBLEMS                VARIOUS  RISK   FACTORS   WERE  REVIEWED   AND   DISCUSSED. *  PATIENT   HAS  MULTIPLE   MEDICAL, NEUROLOGICAL     PROBLEMS . PATIENT'S   MANAGEMENT  IS  CHALLENGING.             PLAN:                         Sandra Crowley  Of  The  Diagnoses,  The  Management & Treatment  Options            AND    Care  plan  Were          Reviewed and   Discussed With  patient. * Goals  And  Expectations  Of  The  Therapy  Discussed   And  Reviewed. *   Benefits   And   Side  Effect  Profile  Of  Medication/s   Were   Discussed                * Need   For  Further   Follow up For  The  Various  Problems Were  discussed. * Results  Of  The  Previous  Diagnostic tests were reviewed and  discussed                   Medical  Decision  Making  Was  Made  Based on the   Complexity  Of  Above  Mentioned  Diagnoses,    Data reviewed             And    Risk  Of  Significant   Co morbidities and   complicating   Factors. Medical  Decision  Was      High     Complexity   Due   To  The  Patient's  Multiple  Symptoms  &  Disease,            Complex  Treatment  Regimen,  Multiple medications           and   Risk  Of   Side  Effects,  Difficulty  In  Medication  Management  And  Diagnostic  Challenges           In  View  Of  The  Associated   Co  Morbid  Conditions   And  Problems. *   BE  CAREFUL  WITH  ACTIVITIES   INCLUDING  DRIVING. *   AVOID   NECK  AND/ BACK  STRAINING  ACTIVITIES              *   ADEQUATE   FLUID  INTAKE   AND  ELECTROLYTE  BALANCE           * KEEP  DAIRY  OF   THE  NEUROLOGICAL  SYMPTOMS        RECORDING THE    DURATION  AND  FREQUENCY. *  AVOID    CONDITIONS  AND  FACTORS   THAT  MAKE                  NEUROLOGICAL  SYMPTOMS  WORSE.                              *TO  MAINTAIN  REGULAR  SLEEP  WAKE  CYCLES.      *   TO  HAVE  ADEQUATE  REST  AND   SLEEP    HOURS.            *    AVOID  ANY USAGE OF    TOBACCO,          AVOID  EXCESSIVE  ALCOHOL  AND   ILLEGAL   SUBSTANCES                       -   REVIEWED    AND  DISCUSSED     WITH PATIENT IN  DETAIL            *  CONTINUE     TYLENOL       AS   NEEDED              AVOID     EXCESSIVE    USAGE  OF   IBUPROFEN               *    MIGRAINE/ HEADACHE    DAIRY   WITH  MONITORING                       OF  DURATION  AND FREQUENCY. *  MEDICATIONS TO AVOID:    Kunal Hearing          *    Prophylactic  Use   Of     Vitamin   B   Complex,  Folic  Acid,    Vitamin  B12    Multivitamin,       Calcium  With  magnesium  And  Vit D    Supplementations   Over  The  Counter  Discussed             *  PATIENT  IS  ALSO   COUNSELED   TO  KEEP    ACTIVITIES:         A)   SIMPLE      B)  ORGANIZED      C)  WRITEDOWN                     *  EVALUATIONS  AND  FOLLOW UP:                   *  SPEECH  THERAPY                                            *   CURRENTLY  PATIENT  DENIES   BEING  PREGNANT                  AND   HAS  NO  PLANS  TO  GET  PREGNANT.                                                    *   IN  VIEW  OF  THE  PATIENT'S    MULTIPLE   NEUROLOGICAL                           SYMPTOMS  AND  CONCERNS    FOR  PROLONGED   DURATION,                          PATIENT    NEEDS  NEURO  DIAGNOSTIC  EVALUATIONS                      FOR   ANY   NEUROLOGICAL  PATHOLOGIES    AND  OTHER                        CORRECTABLE   ETIOLOGIES;     AND                              PATIENT  REQUESTS   THE  SAME. Orders Placed This Encounter   Procedures    CT HEAD WO CONTRAST    CBC    Electrolyte Panel    TSH    Vitamin B12 & Folate    EEG                           *  PATIENT  TO  RETURN  THE  CLINIC  AFTER   ABOVE,                       FOR   FURTHER    RE EVALUATIONS                               AND  ADDITIONAL  RECOMMENDATIONS              *PATIENT   TO  FOLLOW  UP  WITH   PRIMARY  CARE         OTHER  CONSULTANTS  AS  BEFORE. *  Maintain   Healthy  Life Style    With   Periodic  Monitoring  Of          Any  Medical  Conditions  Including   Elevated  Blood  Pressure,  Lipid  Profile,        Blood  Sugar levels  AndHeart  Disease.                 *   Period   Screening  For  Cancers  Involving  Breast,  Colon,         Lungs  And  Other  Organs  As  Applicable,  In consultation With  Your  Primary Care Providers. *Second  Neurological  Opinion  And  Evaluations  In  [facility] AND [facility]  Setting  If  Patient  Is  Interested. * Please   Contact   Neurology  Clinic   Early   If   Are  Any  New  Neurological   And  Any neurological  Concerns. *  If  The  Patient remains  Neurologically  Stable   Return   To  [facility] Neurology Department   IN      1-2        MONTHS  TIME   FOR  FURTHER              FOLLOW UP.                       *   The  Neurological   Findings,  Possible  Diagnosis,  Differential diagnoses                    And  Options  For    Further   Investigations                   And  management   Are  Discussed  Comprehensively. Medications   And  Prescription   Risks  And  Side effects  Are   Also  Discussed. *  If   There is  Any  Significant  Worsening   Of  Current  Symptoms  And  Or                  If patient  Develops   Any additional  New  NeurologicalSymptoms                  Or  Significant  Concerns   Should  Call  911 or  Go  To  Emergency  Department                  For  Further  Immediate  Evaluation and  management . The   Above  Were  Reviewed  With  PATIENT   and   HER  MOTHER                           questions  Answered  In  Detail. TOTAL   TIME     SPENT :               On this date 10/13/2021 I have spent  60  minutes    reviewing previous notes, test results               and face to face time with the patient including     discussing the diagnosis and importance of compliance               with the treatment plan  as well as documenting on the day of the visit. Electronically signed by   Mikayla Seaman M.D., Yuriy Painter.      Board Certified in  Neurology &  In  [facility] and [facility] ([employer])      DISCLAIMER:   Although every effort was made to ensure the accuracy of this  electronictranscription, some errors in transcription may have occurred. GENERAL PATIENT INSTRUCTIONS:      A Healthy Lifestyle: Care Instructions   Your Care Instructions   A healthy lifestyle can help you feel good, stay at ahealthy weight, and have plenty of energy for both work and play. A healthy lifestyle is something you can share with your whole family.  A healthy lifestyle also can lower your risk for serious health problems, such ashigh blood pressure, heart disease, and diabetes.  You can follow a few steps listed below to improve your health and the health of your family.  Follow-up careis a key part of your treatment and safety. Be sure to make and go to all appointments, and call your doctor if you are having problems. Its also a good idea to know your test results and keep a list of the medicines you take.  How can you care for yourself at home?  Do not eat too much sugar, fat, or fast foods. You can still have dessert and treats nowand then. The goal is moderation.  Start small to improve your eating habits. Pay attention to portion sizes, drink less juice and soda pop, and eat more fruits and vegetables.  Eat a healthy amount of food. A 3-ounce serving of meat, for example, is about the size of a deck of cards. Fill the rest of your plate with vegetables and whole grains.  Limit theamount of soda and sports drinks you have every day. Drink more water when you are thirsty.  Eat at least 5 servings of fruits and vegetables every day. It may seem like a lot, but it is not hard to reach this goal. Aserving or helping is 1 piece of fruit, 1 cup of vegetables, or 2 cups of leafy, raw vegetables. Have an apple or some carrot sticks as an afternoon snack instead of a candy bar. Try to have fruits and/or vegetables at everymeal.   Make exercise part of your daily routine.  You may want to start with simple activities, such as walking, bicycling, or slow swimming. Try keaton active 30 to 60 minutes every day. You do not need to do all 30 to 60 minutes all at once. For example, you can exercise 3 times a day for 10 or 20 minutes. Moderate exercise is safe for most people, but it is always agood idea to talk to your doctor before starting an exercise program.   Keep moving. [employer] the lawn, work in the garden, or [employer]. Take the stairs instead of the elevator at work.  If you smoke, quit. Peoplewho smoke have an increased risk for heart attack, stroke, cancer, and other lung illnesses. Quitting is hard, but there are ways to boost your chance of quitting tobacco for good.  Use nicotine gum, patches, or lozenges.  Ask your doctor about stop-smoking programs and medicines.  Keep trying.  In addition to reducing your risk of diseases in the future, you will notice some benefits soon after you stop using tobacco. If you have shortness of breath or asthma symptoms, they will likely getbetter within a few weeks after you quit.  Limit how much alcohol you drink. Moderate amounts of alcohol (up to 2 drinks a day for men, 1drink a day for women) are okay. But drinking too much can lead to liver problems, high blood pressure, and other health problems.  health   If you have a family, there are many things you can do together to improve your health.  Eat meals together as a family as often as possible.  Eat healthy foods. This includes fruits, vegetables, lean meats and dairy, and whole grains.  Include your family in your fitness plan. Most peoplethink of activities such as jogging or tennis as the way to fitness, but there are many ways you and your family can be more active. Anything that makes you breathe hard and gets your heart pumping is exercise. Here are sometips:   Walk to do errands or to take your child to school or the bus.    Go for a family bike ride after dinner

## 2021-10-21 ENCOUNTER — TELEPHONE (OUTPATIENT)
Dept: NEUROLOGY | Age: 26
End: 2021-10-21

## 2021-10-21 NOTE — TELEPHONE ENCOUNTER
This writer contacted patient in re: letter for court - informed patient that this writer spoke with Dr. Batool Romero in these regards, he is not able to writer a letter at this time as he needs to see patient in the office first to review testing and speak with patient further. Patient voiced understanding - will wait until after testing is complete and proceed with scheduled follow up date. Patient to contact office with further needs.

## 2021-10-21 NOTE — TELEPHONE ENCOUNTER
Patient contacted this writer in re: needing note stating if she is clear or not to proceed with classes ordered per court in [place] as long as she can take her medication for headaches as the classes contain book work, writing and staring at a TV. Last Appt:  10/13/2021  Next Appt:   11/17/2021      Please advise, thank you.

## 2021-11-15 ENCOUNTER — HOSPITAL ENCOUNTER (OUTPATIENT)
Dept: CT IMAGING | Age: 26
Discharge: HOME | End: 2021-11-17
Payer: COMMERCIAL

## 2021-11-15 ENCOUNTER — HOSPITAL ENCOUNTER (OUTPATIENT)
Dept: NEUROLOGY | Age: 26
Discharge: HOME | End: 2021-11-15
Payer: COMMERCIAL

## 2021-11-15 ENCOUNTER — HOSPITAL ENCOUNTER (OUTPATIENT)
Dept: LAB | Age: 26
Discharge: HOME | End: 2021-11-15
Payer: COMMERCIAL

## 2021-11-15 DIAGNOSIS — S06.9X9A FRACTURE OF OCCIPITAL BONE OF SKULL WITH LOSS OF CONSCIOUSNESS (HCC): ICD-10-CM

## 2021-11-15 DIAGNOSIS — I60.9 SUBARACHNOID BLEED (HCC): ICD-10-CM

## 2021-11-15 DIAGNOSIS — S02.119A FRACTURE OF OCCIPITAL BONE OF SKULL WITH LOSS OF CONSCIOUSNESS (HCC): ICD-10-CM

## 2021-11-15 DIAGNOSIS — S06.6X3S: ICD-10-CM

## 2021-11-15 DIAGNOSIS — S06.2X9S: ICD-10-CM

## 2021-11-15 DIAGNOSIS — G93.89 ENCEPHALOMALACIA ON IMAGING STUDY: ICD-10-CM

## 2021-11-15 LAB
ANION GAP SERPL CALCULATED.3IONS-SCNC: 10 MMOL/L (ref 9–17)
CHLORIDE BLD-SCNC: 103 MMOL/L (ref 98–107)
CO2: 25 MMOL/L (ref 20–31)
FOLATE: 6.3 NG/ML
HCT VFR BLD CALC: 39.9 % (ref 36.3–47.1)
HEMOGLOBIN: 13.1 G/DL (ref 11.9–15.1)
MCH RBC QN AUTO: 33.6 PG (ref 25.2–33.5)
MCHC RBC AUTO-ENTMCNC: 32.8 G/DL (ref 25.2–33.5)
MCV RBC AUTO: 102.3 FL (ref 82.6–102.9)
NRBC AUTOMATED: 0 PER 100 WBC
PDW BLD-RTO: 11.2 % (ref 11.8–14.4)
PLATELET # BLD: 255 K/UL (ref 138–453)
PMV BLD AUTO: 9.9 FL (ref 8.1–13.5)
POTASSIUM SERPL-SCNC: 4 MMOL/L (ref 3.7–5.3)
RBC # BLD: 3.9 M/UL (ref 3.95–5.11)
SODIUM BLD-SCNC: 138 MMOL/L (ref 135–144)
TSH SERPL DL<=0.05 MIU/L-ACNC: 3.52 MIU/L (ref 0.3–5)
VITAMIN B-12: 430 PG/ML (ref 232–1245)
WBC # BLD: 5.9 K/UL (ref 3.5–11.3)

## 2021-11-15 PROCEDURE — 70450 CT HEAD/BRAIN W/O DYE: CPT

## 2021-11-15 PROCEDURE — 84443 ASSAY THYROID STIM HORMONE: CPT

## 2021-11-15 PROCEDURE — 80051 ELECTROLYTE PANEL: CPT

## 2021-11-15 PROCEDURE — 36415 COLL VENOUS BLD VENIPUNCTURE: CPT

## 2021-11-15 PROCEDURE — 85027 COMPLETE CBC AUTOMATED: CPT

## 2021-11-15 PROCEDURE — 95813 EEG EXTND MNTR 61-119 MIN: CPT

## 2021-11-15 PROCEDURE — 82746 ASSAY OF FOLIC ACID SERUM: CPT

## 2021-11-15 PROCEDURE — 82607 VITAMIN B-12: CPT

## 2021-11-15 NOTE — PROGRESS NOTES
EXTENDED EEG Completed with [facility]. PCP: FLAVIO Baca - CNP    Ordering: Montrell Jackson.  Aggie Neurologist    Interpreting Physician: Nigel Preston Neurologist    Technician: Dulce Gonzalez RN

## 2021-11-16 NOTE — PROCEDURES
ADDENDUM    TO   EEG   REPORT        EEG     study   was    recorded     for     One  Hour   and  Two  minutes       And    the    addendum   was    made    to   comply     with   Coding         Requirements . [address]                 [address] [place] [address], 00 [facility]                         ELECTROENCEPHALOGRAM (EEG) REPORT        PATIENT NAME: Savanna Osorio                :        1995  MED REC NO:   [id number]                             ROOM:  ACCOUNT NO:   [de-identified]                           ADMIT DATE: 11/15/2021      PROVIDER:     Rosemary Carlos MD      DATE OF STUDY:  11/15/2021      PRIMARY CARE PROVIDER: Claudell Gent, APRN - CNP      TECHNIQUE:  23 channels of EEG, 2 channels of EOG, 2 channel of EKG and  1 channel ground and 1 channel reference were recorded with [employer]  Software 32 Channel System utilizing the International 10/20 System  Protocol. Laz detection and seizure analysis protocols were utilized and the  study was reviewed using the comprehensive EEG monitoring. This is an extended EEG recorded for more than one hour. CLINICAL DATA:        The patient is 31years old with a history of traumatic  subarachnoid hemorrhage, cortical contusion, fracture of occipital bone  in the past, postconcussive headaches, memory problems. The purpose of the study is to evaluate for associated seizure activity. BACKGROUND ACTIVITY:      While the patient was awake, the background  activity consisted of fairly-regulated 9 Hz waveform seen over both  cerebral hemispheres reacting to the eye opening. ACTIVATION PROCEDURES:      HYPERVENTILATION:  Hyperventilation was performed for 3 minutes. Patient  was cooperative with good effort. Also, post-hyperventilation period  was monitored closely. Hyperventilation:  Mild diffuse slowing noted.       PHOTIC STIMULATION:  Photic stimulation was performed at the following  frequencies: 1 Hz, 3 Hz, 6 Hz, 9 Hz, 12 Hz, 15 Hz, 18 Hz, 21 Hz, 25 Hz,  30 Hz and patient tolerated well. Photic stimulation:  Bilateral symmetric driving response noted. SLEEP:  Stage I and stage II sleep were noted. EKG:  EKG showed normal sinus rhythm without any significant  abnormality. IMPRESSION:        No significant focal, lateralized or epileptiform features    noted during the current recording. Further clinical correlation and followup recommended. Dina Bland MD, [facility]     Board Certified in Neurology  & in  [facility] and [facility] ([employer]).                 D: 11/16/2021 10:33:45       T: 11/16/2021 10:56:35     [email or web address]  Job#: [id number]     Doc#: [id number]    CC:    FLAVIO Quick - CNP

## 2021-11-17 ENCOUNTER — OFFICE VISIT (OUTPATIENT)
Dept: NEUROLOGY | Age: 26
End: 2021-11-17
Payer: COMMERCIAL

## 2021-11-17 VITALS
DIASTOLIC BLOOD PRESSURE: 78 MMHG | SYSTOLIC BLOOD PRESSURE: 118 MMHG | BODY MASS INDEX: 29.82 KG/M2 | HEART RATE: 75 BPM | OXYGEN SATURATION: 98 % | HEIGHT: 67 IN | WEIGHT: 190 LBS

## 2021-11-17 DIAGNOSIS — R43.0 ANOSMIA: ICD-10-CM

## 2021-11-17 DIAGNOSIS — R41.3 MEMORY PROBLEM: ICD-10-CM

## 2021-11-17 DIAGNOSIS — U07.1 COVID-19: ICD-10-CM

## 2021-11-17 DIAGNOSIS — R43.9 DISTURBANCE OF SMELL AND TASTE: ICD-10-CM

## 2021-11-17 DIAGNOSIS — G44.309 POST-CONCUSSION HEADACHE: Primary | ICD-10-CM

## 2021-11-17 DIAGNOSIS — S02.119A FRACTURE OF OCCIPITAL BONE OF SKULL WITH LOSS OF CONSCIOUSNESS (HCC): ICD-10-CM

## 2021-11-17 DIAGNOSIS — R43.2 TASTE ABSENT: ICD-10-CM

## 2021-11-17 DIAGNOSIS — G93.89 ENCEPHALOMALACIA ON IMAGING STUDY: ICD-10-CM

## 2021-11-17 DIAGNOSIS — S06.6X3S: ICD-10-CM

## 2021-11-17 DIAGNOSIS — S06.9X9A FRACTURE OF OCCIPITAL BONE OF SKULL WITH LOSS OF CONSCIOUSNESS (HCC): ICD-10-CM

## 2021-11-17 PROCEDURE — 99215 OFFICE O/P EST HI 40 MIN: CPT | Performed by: PSYCHIATRY & NEUROLOGY

## 2021-11-17 PROCEDURE — 99212 OFFICE O/P EST SF 10 MIN: CPT | Performed by: PSYCHIATRY & NEUROLOGY

## 2021-11-17 ASSESSMENT — ENCOUNTER SYMPTOMS
COLOR CHANGE: 0
EYE DISCHARGE: 0
EYE PAIN: 0
BACK PAIN: 0
CHOKING: 0
APNEA: 0
VOICE CHANGE: 0
WHEEZING: 0
CHEST TIGHTNESS: 0
EYE REDNESS: 0
SINUS PRESSURE: 0
NAUSEA: 0
TROUBLE SWALLOWING: 0
ABDOMINAL DISTENTION: 0
VOMITING: 0
BOWEL INCONTINENCE: 0
FACIAL SWELLING: 0
BLOOD IN STOOL: 0
PHOTOPHOBIA: 0
CONSTIPATION: 0
ABDOMINAL PAIN: 0
SORE THROAT: 0
DIARRHEA: 0
VISUAL CHANGE: 0
EYE ITCHING: 0
SHORTNESS OF BREATH: 0
COUGH: 0

## 2021-11-17 NOTE — PROGRESS NOTES
[facility]   CART     WHILE  INTOXICATED      IN   AUG.  2021                            2)     CT    HEAD   8/11/2021      SHOWED  :                               A)   BIFRONTAL  CONTUSIONS                              B)   TRAUMATIC    SUBARACHNOID    HEMORRHAGE                               C)      RIGHT  OCCIPITAL    FRACTURE   NON  DISPLACED                                   PATIENT    WAS    HOSPITALIZED     WITH   ETOH  LEVEL   OF   50                                   MANAGED     NON  SURGICALLY     BY  NEUROSURGERY                                  AND  DISCHARGED     ON    8/13/2021                           3)     H/O    RETROGRADE     AND   ANTEGRADE    AMNESIA                                  FOR     TRAUMATIC  BRAIN INJURY                            4)      CT   HEAD     ON   8/30/2021       SHOWED                               A)    RESOLVED     FRONTAL   SUBDURAL   HEMATOMA                                           AND    SUBARACHNOID    HEMORRHAGE                               B)    ENCEPHALOMALACIA    BILATERAL   INFERIOR  FRONTAL  GYRUS                                           CONSISTENT     WITH    PRIOR  CONTUSION                                  ALSO   HAD  NEURO  SURGERY   FOLLOW  UP   EVALUATIONS                                 -   ABOVE     RECORDS,    REPORTS      REVIEWED  IN  DETAIL.                                          IN    THE  PAST                             5)     H/O     POST    CONCUSSION    HEAD ACHES                                        -  IMPROVED                                     -  ON  TYLENOL ,   IBUPROFEN     AS  NEEDED                          6)       POST  CONCUSSION     SYNDROME     WITH                               H/O    MILD      MEMORY    AND    ATTENTION  PROBLEMS                                  -    PATIENT     INDICATED    SHE    STILL   HAVE                                              COGNITIVE  DIFFICULTIES                          7)    PATIENT  INCREASED RISK  FOR                                   POST  TRUAMATIC     SEIZURE  ACTIVITY                           -    NO    CLINICAL   SEIZURE   ACTIVITY                                                           8)        PATIENT    COMPLETED   NEURO  DIAGNOSTIC  EVALUATIONS                                       IN   NOV. 2021                                   A)    CT   HEAD    SHOWED                                       NO  ACUTE  PATHOLOGY                                        POST  TRAUMATIC    ENCEPHALOMALACIA                                          INFERIOR    BI FRONTAL  LOBES                                  B)    EEG    AND  LABS       SHOWED                                             NO  SIGNIFICANT  ABNORMALITIES                                                     -    RESULTS    REVIEWED                                9)       PATIENT      DENIES    ANY   BALANCE    PROBLEMS. NO   DIZZINESS. NO  CLINICAL   SEIZURE  ACTIVITY. PATIENT  COMPLAINED      MILD   RESIDUAL    COGNITIVE                                 DIFFICULTIES     AND    MILD   INTERMITTENT   FRONTAL   HEADACHES                                                -   NEEDS  MONITORING                           10)       H/O   COVID  -19      IN   AUG.  2021    PRIOR  TO    HEAD  INJURY. PATIENT  INDICATED      SHE   DID  NOT     RECOVER                                      HER   SENSES   OF    SMELL    AND  TASTE                                        -    RECOMMEND   MONITORING     AND                                11)       VARIOUS  RISK   FACTORS   WERE  REVIEWED   AND   DISCUSSED. PATIENT   HAS  MULTIPLE   MEDICAL, NEUROLOGICAL   PROBLEMS . PATIENT'S   MANAGEMENT  IS  CHALLENGING.                                         PRECIPITATING  FACTORS: including  fever/infection, exertion/relaxation, position change, stress,                weather change,   medications/alcohol, time of day/darkness/light  Are  absent                                                          MODIFYING  FACTORS:  fever/infection, exertion/relaxation, position change, stress, weather change,               medications/alcohol, time of day/darkness/light  Are  absent                Patient   Indicates   The  Presence   And  The  Absence  Of  The  Following    Associated  And             Additional  Neurological    Symptoms:                                Balance  And coordination   problems  absent           Gait problems     absent            Headaches      present              Migraines           absent           Memory problemspresent              Confusion        absent            Paresthesia   numbness          absent           Seizures  And  Starring  Episodes           absent           Syncope,  Near  syncopal episodes         absent           Speech   problems           absent             Swallowing   Problems      absent            Dizziness,  Light headedness           absent              Vertigo        absent             Generalized   Weakness    absent              focal  Weakness     absent             Tremors         absent              Sleep  Problems     absent             History  Of   Recent  Head  Injury     absent             History  Of   Recent  TIA     absent             History  Of   Recent    Stroke     absent             Neck  Pain   and   Neck muscle  Spasms  absent               Radiating  down   And   Weakness           absent            Lower back   Pain  And     Spasms  absent              Radiating    Down   And   Weakness          absent                H/OFALLS        absent               History  Of   Visual  Symptoms    absent                  Associated   Diplopia       absent                                               Also   Additional   Symptoms   Present    As  Documented    In   The Financial Resource Strain:     Difficulty of Paying Living Expenses: Not on file   Food Insecurity:     Worried About Running Out of Food in the Last Year: Not on file    Jessie of Food in the Last Year: Not on file   Transportation Needs:     Lack of Transportation (Medical): Not on file    Lack of Transportation (Non-Medical):  Not on file   Physical Activity:     Days of Exercise per Week: Not on file    Minutes of Exercise per Session: Not on file   Stress:     Feeling of Stress : Not on file   Social Connections:     Frequency of Communication with Friends and Family: Not on file    Frequency of Social Gatherings with Friends and Family: Not on file    Attends Yarsani Services: Not on file    Active Member of [address] [place] [address] [employer] or Organizations: Not on file    Attends Club or Organization Meetings: Not on file    Marital Status: Not on file   Intimate Partner Violence:     Fear of Current or Ex-Partner: Not on file    Emotionally Abused: Not on file    Physically Abused: Not on file    Sexually Abused: Not on file   Housing Stability:     Unable to Pay for Housing in the Last Year: Not on file    Number of [place] in the Last Year: Not on file    Unstable Housing in the Last Year: Not on file       Vitals:    11/17/21 1438   BP: 118/78   Pulse: 75   SpO2: 98%         Wt Readings from Last 3 Encounters:   11/17/21 190 lb (86.2 kg)   10/13/21 184 lb (83.5 kg)   09/08/21 193 lb (87.5 kg)         BP Readings from Last 3 Encounters:   11/17/21 118/78   10/13/21 130/84   09/08/21 109/71           Hematology and Coagulation    Lab Results   Component Value Date    WBC 5.9 11/15/2021    RBC 3.90 11/15/2021    HGB 13.1 11/15/2021    HCT 39.9 11/15/2021    .3 11/15/2021    MCH 33.6 11/15/2021    MCHC 32.8 11/15/2021    RDW 11.2 11/15/2021     11/15/2021    MPV 9.9 11/15/2021           Chemistries    Lab Results   Component Value Date     11/15/2021    K 4.0 11/15/2021     11/15/2021    CO2 25 11/15/2021    BUN 6 08/13/2021    CREATININE 0.46 08/13/2021    CALCIUM 8.9 08/13/2021       Lab Results   Component Value Date    BUN 6 08/13/2021    CREATININE 0.46 08/13/2021     Lab Results   Component Value Date    CALCIUM 8.9 08/13/2021     . Review of Systems   Constitutional: Negative for appetite change, chills, diaphoresis, fatigue, fever and unexpected weight change. HENT: Negative for congestion, dental problem, drooling, ear discharge, ear pain, facial swelling, hearing loss, mouth sores, nosebleeds, postnasal drip, sinus pressure, sore throat, tinnitus, trouble swallowing and voice change. Eyes: Negative for photophobia, pain, discharge, redness, itching and visual disturbance. Respiratory: Negative for apnea, cough, choking, chest tightness, shortness of breath and wheezing. Cardiovascular: Negative for chest pain, palpitations, leg swelling and near-syncope. Gastrointestinal: Negative for abdominal distention, abdominal pain, blood in stool, bowel incontinence, constipation, diarrhea, nausea and vomiting. Endocrine: Negative for cold intolerance, heat intolerance, polydipsia, polyphagia and polyuria. Genitourinary: Negative for bladder incontinence. Musculoskeletal: Negative for arthralgias, back pain, gait problem, joint swelling, myalgias, neck pain and neck stiffness. Skin: Negative for color change, pallor, rash and wound. Allergic/Immunologic: Negative for environmental allergies, food allergies and immunocompromised state. Neurological: Positive for headaches. Negative for dizziness, vertigo, tremors, focal weakness, seizures, syncope, facial asymmetry, speech difficulty, weakness, light-headedness, numbness and loss of balance. Hematological: Negative for adenopathy. Does not bruise/bleed easily. Psychiatric/Behavioral: Positive for decreased concentration and memory loss.  Negative for agitation, behavioral problems, confusion, dysphoric mood, hallucinations, self-injury, sleep disturbance and suicidal ideas. The patient is not nervous/anxious and is not hyperactive. OBJECTIVE:      Physical Exam  Constitutional:       Appearance: She is well-developed. HENT:      Head: Normocephalic and atraumatic. No raccoon eyes or Thomas's sign. Right Ear: External ear normal.      Left Ear: External ear normal.      Nose: Nose normal.   Eyes:      Conjunctiva/sclera: Conjunctivae normal.      Pupils: Pupils are equal, round, and reactive to light. Neck:      Thyroid: No thyroid mass or thyromegaly. Vascular: No carotid bruit. Trachea: No tracheal deviation. Meningeal: Brudzinski's sign and Kernig's sign absent. Cardiovascular:      Rate and Rhythm: Normal rate and regular rhythm. Pulmonary:      Effort: Pulmonary effort is normal.   Musculoskeletal:         General: No tenderness. Normal range of motion. Cervical back: Normal range of motion and neck supple. No rigidity. No muscular tenderness. Normal range of motion. Skin:     General: Skin is warm. Coloration: Skin is not pale. Findings: No erythema or rash. Nails: There is no clubbing. Psychiatric:         Attention and Perception: She is attentive. Mood and Affect: Mood is not anxious or depressed. Affect is not labile, blunt or inappropriate. Speech: She is communicative. Speech is not rapid and pressured, delayed, slurred or tangential.         Behavior: Behavior is not agitated, slowed, aggressive, withdrawn, hyperactive or combative. Behavior is cooperative. Thought Content: Thought content is not paranoid or delusional. Thought content does not include homicidal or suicidal ideation. Thought content does not include homicidal or suicidal plan. Cognition and Memory: Cognition is impaired. She exhibits impaired recent memory. She does not exhibit impaired remote memory.          Judgment: Judgment is not impulsive or inappropriate. NEUROLOGICALEXAMINATION :       A) MENTAL STATUS:                   Alert and  oriented  To time, place  And  Person. No Aphasia. No  Dysarthria. Able   To  Follow     SIMPLE    commands   without   Any  Difficulty. No right  To left confusion. Normal  Speech  And language function. Insight and  Judgment ,Fund  Of  Knowledge   within normal limits                Recent  And  Remote memory   DECREASED                Attention &  Concentration are within   normal limits                                                   B) CRANIAL NERVES :        CN : Visual  Acuity  And  Visual fields  within normal limits               Fundi  Could  Not  Be  Could  Not  Be  Evaluated. 3,4,6 CN : Both  Pupils are   Reactive and  Equal.  Movements  Are  Intact. No  Nystagmus. No  KYLE. No  Afferent  Pupillary  Defect noted. 5 CN :  Normal  Facial sensations and Corneal  Reflexes           7 CN:  Normal  Facial  Symmetry  And  Strength. No facial  Weakness. 8 CN :  Hearing  Appears within normal limits          9, 10 CN: Normal   spontaneous, reflex   palate   movements         11 CN:   Normal  Shoulder  shrug and  strength         12 CN :   Normal  Tongue movements and  Tongue  In midline                        No tongue   Fasciculations or atrophy       C) MOTOR  EXAM:                 Strength  In upper  And  Lower   extremities   within   normal limits               No  Drift. No  Atrophy               Rapid   alternating  And  repetitions  Movements  within   normal limits                 Muscle  Tone  In upper  And  Lower  Extremities  normal                No rigidity. No  Spasticity. Bradykinesia   absent                 No  Asterixis.               Sustention  Tremor , Resting   Tremor   absent                    No   other Abnormal  Movements noted           D) SENSORY :               Light   touch, pinprick,   position  And  Vibration  within normal limits        E) REFLEXES:                   Deep  Tendon  Reflexes   normal                  No  pathological  Reflexes  Bilaterally.                                   F) COORDINATION  AND  GAIT :                                Station and  Gait  normal                              Romberg 's test negative                          Ataxia negative        ASSESSMENT:      Patient Active Problem List   Diagnosis    Subarachnoid bleed (HCC)    Traumatic subarachnoid hemorrhage with loss of consciousness of 1 hour to 5 hours 59 minutes (HCC)    Cortex (cerebral) contusion, with loss of consciousness (HCC)    Fracture of occipital bone of skull with loss of consciousness ([address]    Encephalomalacia on imaging study    Post-concussion headache    Memory problem    COVID-19    Anosmia    Disturbance of smell and taste    Taste absent               CT OF THE HEAD WITHOUT CONTRAST  11/15/2021 10:16 am       TECHNIQUE:   CT of the head was performed without the administration of intravenous   contrast. Dose modulation, iterative reconstruction, and/or weight based   adjustment of the mA/kV was utilized to reduce the radiation dose to as low   as reasonably achievable.       COMPARISON:   CT brain performed 08/30/2021.       HISTORY:   ORDERING SYSTEM PROVIDED HISTORY: Traumatic subarachnoid hemorrhage with loss   of consciousness of 1 hour to 5 hours 59 minutes, sequela ([address]   TECHNOLOGIST PROVIDED HISTORY:   Is the patient pregnant?->No   Reason for Exam: Brain injury 3 months ago with bleeds, right sides headaches   still   Acuity: Acute   Type of Exam: Subsequent/Follow-up       FINDINGS:   BRAIN/VENTRICLES: There is no acute intracranial hemorrhage, mass effect, or   midline shift. Honora Glad is a mild cephalo malacia involving the inferior aspect   of the frontal lobes bilaterally. Felaa Azael is satisfactory overall gray-white   matter differentiation.  The ventricular structures are symmetric and   unremarkable.  The infratentorial structures are unremarkable.       ORBITS: The visualized portion of the orbits demonstrate no acute abnormality.       SINUSES: The visualized paranasal sinuses and mastoid air cells demonstrate   no acute abnormality.       SOFT TISSUES/SKULL:  No acute abnormality of the visualized skull or soft   tissues.           Impression   No acute intracranial abnormality.       Mild posttraumatic encephalomalacia involving the inferior frontal lobes   bilaterally.                 CT OF THE HEAD WITHOUT CONTRAST  8/30/2021 1:05 pm       TECHNIQUE:   CT of the head was performed without the administration of intravenous   contrast. Dose modulation, iterative reconstruction, and/or weight based   adjustment of the mA/kV was utilized to reduce the radiation dose to as low   as reasonably achievable.       COMPARISON:   08/12/2021       HISTORY:   ORDERING SYSTEM PROVIDED HISTORY: SAH (subarachnoid hemorrhage) ([facility]   TECHNOLOGIST PROVIDED HISTORY:   Is the patient pregnant?->No   Reason for Exam: F/u subarachnoid hemorrhage from 8/11/21; no complaints at   this time   Acuity: Acute   Type of Exam: Subsequent/Follow-up       FINDINGS:   Findings:       Previously noted foci of subarachnoid hemorrhage and frontal subdural   hematoma have resolved. Louanna Oh is extensive encephalomalacia involving the   anterior aspect of bilateral  inferior frontal gyrus, consistent with brain   contusion which also contains scattered areas of subtle hyperdensity likely   sequela to prior previously noted foci of intraparenchymal hemorrhage.       There is no acute infarction or intracranial mass lesion.  No mass effect,   midline shift or extra-axial collection is noted.       The brain parenchyma is otherwise normal.  The cerebellar tonsils are in   normal position.       The ventricles, sulci, and cisterns are within normal limits in size and   configuration.       The globes and orbits are within normal limits. The visualized extracranial   structures including paranasal sinuses and mastoid air cells are   unremarkable. No fracture is identified.           Impression       Previously noted foci of subarachnoid hemorrhage and frontal subdural   hematoma have resolved. There is extensive encephalomalacia involving the   anterior aspect of bilateral inferior frontal gyrus, consistent with brain   contusion which also contain scattered areas of subtle hyperdensity likely   sequela to prior previously noted foci of intraparenchymal hemorrhage.       No evidence for acute territorial infarction or intracranial mass lesion.          CT OF THE HEAD WITHOUT CONTRAST  8/11/2021 10:25 pm       TECHNIQUE:   CT of the head was performed without the administration of intravenous   contrast. Dose modulation, iterative reconstruction, and/or weight based   adjustment of the mA/kV was utilized to reduce the radiation dose to as low   as reasonably achievable.       COMPARISON:   None.       HISTORY:   ORDERING SYSTEM PROVIDED HISTORY: fall; [address]   TECHNOLOGIST PROVIDED HISTORY:   Perform at 10pm (2200) please   fall; [address]       Decision Support Exception - unselect if not a suspected or confirmed   emergency medical condition->Emergency Medical Condition (MA)   Is the patient pregnant?->No   Reason for Exam: fall; SAH   Acuity: Acute   Type of Exam: Initial       FINDINGS:   BRAIN/VENTRICLES: There foci hemorrhagic contusion bilateral anterior   inferior frontal lobes with areas of surrounding edema.  Largest parenchymal   hemorrhage in the right measure up to 1.6 x 1.7 cm in size.  Hyperdense   thickening along the interhemispheric falx anteriorly measure up to 4 mm in   thickness.  There is minimal hyperdense thickening identified along the   anterior interhemispheric falx and.  There is mild subarachnoid hemorrhage identified both anterior frontal lobes greatest on left.  Minimal right mild   subarachnoid hemorrhage identified along parasagittal frontal parietal   regions no shift of midline structure.  Basal cisterns are patent.       ORBITS: The visualized portion of the orbits demonstrate no acute abnormality.       SINUSES: Paranasal sinus identified.  Air-fluid level identified both   maxillary sinuses.  Moderate ethmoid sinus mucosal thickening.  Mastoids   grossly clear.       SOFT TISSUES/SKULL:  Right occipital bone fracture nondisplaced.           Impression   Mild subarachnoid hemorrhage and areas of hemorrhagic contusion both anterior   frontal lobes.       Right occipital bone fracture, nondisplaced       Critical results were called by Dr. Yao Cain to Dr. Andrew Shanks on 8/11/2021   at 23:47. CT OF THE CERVICAL SPINE WITHOUT CONTRAST 8/11/2021 10:25 pm       TECHNIQUE:   CT of the cervical spine was performed without the administration of   intravenous contrast. Multiplanar reformatted images are provided for review. Dose modulation, iterative reconstruction, and/or weight based adjustment of   the mA/kV was utilized to reduce the radiation dose to as low as reasonably   achievable.       COMPARISON:   None.       HISTORY:   ORDERING SYSTEM PROVIDED HISTORY: Trauma   TECHNOLOGIST PROVIDED HISTORY:   Trauma   Is the patient pregnant?->No   Reason for Exam: Trauma   Acuity: Acute   Type of Exam: Initial       FINDINGS:   BONES/ALIGNMENT: There is no acute fracture or traumatic malalignment of the   cervical spine.  There is a right nondisplaced occipital bone fracture       DEGENERATIVE CHANGES: No significant degenerative changes.       SOFT TISSUES: There is no prevertebral soft tissue swelling.           Impression   No acute fracture traumatic malalignment of the cervical spine.       Nondisplaced occipital bone fracture again identified.             VISITING DIAGNOSIS:        ICD-10-CM    1. Post-concussion headache  G44.309    2. Encephalomalacia on imaging study  G93.89    3. Fracture of occipital bone of skull with loss of consciousness ([facility]  S02.119A     S06. 9X9A    4. Traumatic subarachnoid hemorrhage with loss of consciousness of 1 hour to 5 hours 59 minutes, sequela (HCC)  S06. 6X3S    5. Memory problem  R41.3    6. COVID-19  U07.1    7. Anosmia  R43.0    8. Disturbance of smell and taste  R43.9    9. Taste absent  R43.2                 CONCERNS   &   INCREASED   RISK   FOR        *  SEIZURE  ACTIVITY,      *     MIGRAINES  AND   TENSION  HEADACHES      *   COGNITIVE  &   MEMORY PROBLEMS                VARIOUS  RISK   FACTORS   WERE  REVIEWED   AND   DISCUSSED. *  PATIENT   HAS  MULTIPLE   MEDICAL, NEUROLOGICAL     PROBLEMS . PATIENT'S   MANAGEMENT  IS  CHALLENGING. PLAN:                         Terese Leiva  Of  The  Diagnoses,  The  Management & Treatment  Options            AND    Care  plan  Were          Reviewed and   Discussed   With  patient. * Goals  And  Expectations  Of  The  Therapy  Discussed   And  Reviewed. *   Benefits   And   Side  Effect  Profile  Of  Medication/s   Were   Discussed                * Need   For  Further   Follow up For  The  Various  Problems Were  discussed. * Results  Of  The  Previous  Diagnostic tests were reviewed and  discussed                   Medical  Decision  Making  Was  Made  Based on the   Complexity  Of  Above  Mentioned  Diagnoses,    Data reviewed             And    Risk  Of  Significant   Co morbidities and   complicating   Factors.                  Medical  Decision  Was      High     Complexity   Due   To  The  Patient's  Multiple  Symptoms  &  Disease,            Complex  Treatment  Regimen,  Multiple medications           and   Risk  Of   Side  Effects,  Difficulty  In  Medication  Management  And  Diagnostic  Challenges           In  View  Of  The  Associated   Co  Morbid  Conditions   And ENCEPHALOMALACIA                                          INFERIOR    BI FRONTAL  LOBES                                  B)    EEG    AND  LABS       SHOWED                                             NO  SIGNIFICANT  ABNORMALITIES                                                     -    RESULTS    REVIEWED                                  *         PATIENT      DENIES    ANY   BALANCE    PROBLEMS. NO   DIZZINESS. NO  CLINICAL   SEIZURE  ACTIVITY. PATIENT  COMPLAINED      MILD   RESIDUAL    COGNITIVE                                 DIFFICULTIES     AND    MILD   INTERMITTENT   FRONTAL   HEADACHES                                      -    NEEDS   MONITORING                      *PATIENT   TO  FOLLOW  UP  WITH   PRIMARY  CARE         OTHER  CONSULTANTS  AS  BEFORE. *  Maintain   Healthy  Life Style    With   Periodic  Monitoring  Of          Any  Medical  Conditions  Including   Elevated  Blood  Pressure,  Lipid  Profile,        Blood  Sugar levels  AndHeart  Disease. *   Period   Screening  For  Cancers  Involving  Breast,  Colon,         Lungs  And  Other  Organs  As  Applicable,  In consultation   With  Your  Primary Care Providers. *Second  Neurological  Opinion  And  Evaluations  In  [facility] AND [facility]  Setting  If  Patient  Is  Interested. * Please   Contact   Neurology  Clinic   Early   If   Are  Any  New  Neurological   And  Any neurological  Concerns.                    *  If  The  Patient remains  Neurologically  Stable   Return   To  [facility] Neurology Department   IN     3 - 6          MONTHS  TIME   FOR  FURTHER              FOLLOW UP.                       *   The  Neurological   Findings,  Possible  Diagnosis,  Differential diagnoses                    And  Options  For    Further   Investigations                   And  management   Are Discussed  Comprehensively. Medications   And  Prescription   Risks  And  Side effects  Are   Also  Discussed. *  If   There is  Any  Significant  Worsening   Of  Current  Symptoms  And  Or                  If patient  Develops   Any additional  New  NeurologicalSymptoms                  Or  Significant  Concerns   Should  Call  911 or  Go  To  Emergency  Department                  For  Further  Immediate  Evaluation and  management . The   Above  Were  Reviewed  With  PATIENT   and   HER  MOTHER                           questions  Answered  In  Detail. TOTAL   TIME     SPENT :                On this date 11/17/2021 I have spent    40  minutes    reviewing previous notes, test results               and face to face time with the patient including   discussing the diagnosis and importance of compliance               with the treatment plan  as well as documenting on the day of the visit. Electronically signed by   Rick Pritchard M.D., Meghan Avalos. Board Certified in  Neurology &  In  [facility] ([facility])      DISCLAIMER:   Although every effort was made to ensure the accuracy of this  electronictranscription, some errors in transcription may have occurred. GENERAL PATIENT INSTRUCTIONS:      A Healthy Lifestyle: Care Instructions   Your Care Instructions   A healthy lifestyle can help you feel good, stay at ahealthy weight, and have plenty of energy for both work and play. A healthy lifestyle is something you can share with your whole family.  A healthy lifestyle also can lower your risk for serious health problems, such ashigh blood pressure, heart disease, and diabetes.  You can follow a few steps listed below to improve your health and the health of your family.    Follow-up careis a key part of your treatment and safety. Be sure to make and go to all appointments, and call your doctor if you are having problems. Its also a good idea to know your test results and keep a list of the medicines you take.  How can you care for yourself at home?  Do not eat too much sugar, fat, or fast foods. You can still have dessert and treats nowand then. The goal is moderation.  Start small to improve your eating habits. Pay attention to portion sizes, drink less juice and soda pop, and eat more fruits and vegetables.  Eat a healthy amount of food. A 3-ounce serving of meat, for example, is about the size of a deck of cards. Fill the rest of your plate with vegetables and whole grains.  Limit theamount of soda and sports drinks you have every day. Drink more water when you are thirsty.  Eat at least 5 servings of fruits and vegetables every day. It may seem like a lot, but it is not hard to reach this goal. Aserving or helping is 1 piece of fruit, 1 cup of vegetables, or 2 cups of leafy, raw vegetables. Have an apple or some carrot sticks as an afternoon snack instead of a candy bar. Try to have fruits and/or vegetables at everymeal.   Make exercise part of your daily routine. You may want to start with simple activities, such as walking, bicycling, or slow swimming. Try keaton active 30 to 60 minutes every day. You do not need to do all 30 to 60 minutes all at once. For example, you can exercise 3 times a day for 10 or 20 minutes. Moderate exercise is safe for most people, but it is always agood idea to talk to your doctor before starting an exercise program.   Keep moving. [facility] the lawn, work in the garden, or [employer]. Take the stairs instead of the elevator at work.  If you smoke, quit. Peoplewho smoke have an increased risk for heart attack, stroke, cancer, and other lung illnesses. Quitting is hard, but there are ways to boost your chance of quitting tobacco for good.    Use nicotine gum,

## 2021-12-09 ENCOUNTER — TELEPHONE (OUTPATIENT)
Dept: NEUROLOGY | Age: 26
End: 2021-12-09
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
## 2021-12-09 NOTE — TELEPHONE ENCOUNTER
CHART   REVIEWED. PATIENT   MAY  BE  EXEMPTED    FROM   CURRENT   JURY  DUTY      AS   PER   HER    REQUEST,    DUE    TO     HER    NEUROLOGICAL  PROBLEMS.       LETTER OK  WITH  ME.        [facility]

## 2022-07-01 ENCOUNTER — TELEPHONE (OUTPATIENT)
Dept: NEUROLOGY | Age: 27
End: 2022-07-01

## 2022-08-08 ENCOUNTER — OFFICE VISIT (OUTPATIENT)
Dept: NEUROLOGY | Age: 27
End: 2022-08-08
Payer: COMMERCIAL

## 2022-08-08 ENCOUNTER — HOSPITAL ENCOUNTER (OUTPATIENT)
Dept: LAB | Age: 27
Discharge: HOME | End: 2022-08-08

## 2022-08-08 VITALS
WEIGHT: 226.6 LBS | OXYGEN SATURATION: 99 % | DIASTOLIC BLOOD PRESSURE: 82 MMHG | HEART RATE: 99 BPM | SYSTOLIC BLOOD PRESSURE: 124 MMHG | BODY MASS INDEX: 35.56 KG/M2 | HEIGHT: 67 IN

## 2022-08-08 DIAGNOSIS — G44.309 POST-CONCUSSION HEADACHE: ICD-10-CM

## 2022-08-08 DIAGNOSIS — G56.03 BILATERAL CARPAL TUNNEL SYNDROME: ICD-10-CM

## 2022-08-08 DIAGNOSIS — G93.89 ENCEPHALOMALACIA ON IMAGING STUDY: ICD-10-CM

## 2022-08-08 DIAGNOSIS — R43.2 TASTE ABSENT: ICD-10-CM

## 2022-08-08 DIAGNOSIS — R41.0 CONFUSION: ICD-10-CM

## 2022-08-08 DIAGNOSIS — S06.9X9A FRACTURE OF OCCIPITAL BONE OF SKULL WITH LOSS OF CONSCIOUSNESS (HCC): ICD-10-CM

## 2022-08-08 DIAGNOSIS — S06.6X3S: Primary | ICD-10-CM

## 2022-08-08 DIAGNOSIS — R41.3 MEMORY PROBLEM: ICD-10-CM

## 2022-08-08 DIAGNOSIS — R43.0 ANOSMIA: ICD-10-CM

## 2022-08-08 DIAGNOSIS — S06.2X9S: Chronic | ICD-10-CM

## 2022-08-08 DIAGNOSIS — R43.9 DISTURBANCE OF SMELL AND TASTE: ICD-10-CM

## 2022-08-08 DIAGNOSIS — S02.119A FRACTURE OF OCCIPITAL BONE OF SKULL WITH LOSS OF CONSCIOUSNESS (HCC): ICD-10-CM

## 2022-08-08 LAB
ANION GAP SERPL CALCULATED.3IONS-SCNC: 10 MMOL/L (ref 9–17)
CHLORIDE BLD-SCNC: 103 MMOL/L (ref 98–107)
CO2: 29 MMOL/L (ref 20–31)
HCT VFR BLD CALC: 39.7 % (ref 36.3–47.1)
HEMOGLOBIN: 13.2 G/DL (ref 11.9–15.1)
MCH RBC QN AUTO: 34 PG (ref 25.2–33.5)
MCHC RBC AUTO-ENTMCNC: 33.2 G/DL (ref 25.2–33.5)
MCV RBC AUTO: 102.3 FL (ref 82.6–102.9)
NRBC AUTOMATED: 0 PER 100 WBC
PDW BLD-RTO: 11.6 % (ref 11.8–14.4)
PLATELET # BLD: 282 K/UL (ref 138–453)
PMV BLD AUTO: 9.5 FL (ref 8.1–13.5)
POTASSIUM SERPL-SCNC: 4.4 MMOL/L (ref 3.7–5.3)
RBC # BLD: 3.88 M/UL (ref 3.95–5.11)
SODIUM BLD-SCNC: 142 MMOL/L (ref 135–144)
TSH SERPL DL<=0.05 MIU/L-ACNC: 3.05 UIU/ML (ref 0.3–5)
WBC # BLD: 7 K/UL (ref 3.5–11.3)

## 2022-08-08 PROCEDURE — 99215 OFFICE O/P EST HI 40 MIN: CPT | Performed by: PSYCHIATRY & NEUROLOGY

## 2022-08-08 PROCEDURE — 85027 COMPLETE CBC AUTOMATED: CPT

## 2022-08-08 PROCEDURE — 99214 OFFICE O/P EST MOD 30 MIN: CPT | Performed by: PSYCHIATRY & NEUROLOGY

## 2022-08-08 PROCEDURE — 36415 COLL VENOUS BLD VENIPUNCTURE: CPT

## 2022-08-08 PROCEDURE — 80051 ELECTROLYTE PANEL: CPT

## 2022-08-08 PROCEDURE — 82746 ASSAY OF FOLIC ACID SERUM: CPT

## 2022-08-08 PROCEDURE — 82607 VITAMIN B-12: CPT

## 2022-08-08 PROCEDURE — 84443 ASSAY THYROID STIM HORMONE: CPT

## 2022-08-08 ASSESSMENT — ENCOUNTER SYMPTOMS
EYE ITCHING: 0
WHEEZING: 0
PHOTOPHOBIA: 0
BLOOD IN STOOL: 0
EYE DISCHARGE: 0
ABDOMINAL DISTENTION: 0
FACIAL SWELLING: 0
EYE REDNESS: 0
APNEA: 0
DIARRHEA: 0
COLOR CHANGE: 0
CHOKING: 0
TROUBLE SWALLOWING: 0
CONSTIPATION: 0
BACK PAIN: 0
CHEST TIGHTNESS: 0
SINUS PRESSURE: 0
BOWEL INCONTINENCE: 0
VOICE CHANGE: 0
SHORTNESS OF BREATH: 0
EYE PAIN: 0

## 2022-08-08 NOTE — PROGRESS NOTES
[facility]  Neurology    [address] [address]  TVPPI:[phone]   Fax: [phone]        SUBJECTIVE:       PATIENT ID:  Ayla Jang is a  RIGHT   HANDED 31 y.o. female. Neurologic Problem  The patient's primary symptoms include memory loss. The patient's pertinent negatives include no altered mental status, clumsiness, focal sensory loss, focal weakness, loss of balance, near-syncope, slurred speech or syncope. Primary symptoms comment: POST  CONCUSSION  HEADACHES  AND  MEMORY PROBLEMS  . This is a recurrent problem. Episode onset: [facility] INJURY   DUE  TO  FALL    IN   AUG  2021. The neurological problem developed suddenly. The problem has been waxing and waning since onset. There was no focality noted. Pertinent negatives include no auditory change, aura, back pain, bladder incontinence, bowel incontinence, confusion, dizziness, light-headedness, palpitations or shortness of breath. Past treatments include bed rest, sleep and medication. The treatment provided no relief. Her past medical history is significant for head trauma. There is no history of a bleeding disorder, a clotting disorder, a CVA, dementia, liver disease, mood changes or seizures. History obtained from  The   [address] [place] [address]       and other  available   medical  records   were  Also  reviewed. The  Duration,  Quality,  Severity,  Location,  Timing,  Context,  Modifying  Factors   Of   The   Chief   Complaint       And  Present  Illness  Was   Reviewed   In   Chronological   [facility].                                               PATIENT'S  MAIN  CONCERNS INCLUDE :                         1)     PREVIOUS   H/O    TRAUMATIC    HEAD  INJURY     DUE    FALL  FROM                                  GOLF   CART     WHILE  INTOXICATED      IN   AUG.  2021                            2)     CT    HEAD   8/11/2021      SHOWED  : A)   BIFRONTAL  CONTUSIONS                              B)   TRAUMATIC    SUBARACHNOID    HEMORRHAGE                               C)      RIGHT  OCCIPITAL    FRACTURE   NON  DISPLACED                                   PATIENT    WAS    HOSPITALIZED     WITH   ETOH  LEVEL   OF   50                                   MANAGED     NON  SURGICALLY     BY  NEUROSURGERY                                  AND  DISCHARGED     ON    8/13/2021                           3)     H/O    RETROGRADE     AND   ANTEGRADE    AMNESIA                                  FOR     TRAUMATIC  BRAIN INJURY                            4)      CT   HEAD     ON   8/30/2021       SHOWED                                 A)    RESOLVED     FRONTAL   SUBDURAL   HEMATOMA                                           AND    SUBARACHNOID    HEMORRHAGE                                 B)    ENCEPHALOMALACIA    BILATERAL   INFERIOR  FRONTAL  GYRUS                                           CONSISTENT     WITH    PRIOR  CONTUSION                                  ALSO   HAD  NEURO  SURGERY   FOLLOW  UP   EVALUATIONS                                 -   ABOVE     RECORDS,    REPORTS      REVIEWED  IN  DETAIL.                                          IN    THE  PAST                             5)     H/O     POST    CONCUSSION    HEAD ACHES                                        -  IMPROVED                                     -  ON  TYLENOL ,   IBUPROFEN     AS  NEEDED                          6)       POST  CONCUSSION     SYNDROME     WITH                               H/O    MILD      MEMORY    AND    ATTENTION  PROBLEMS                                  -    PATIENT     INDICATED    SHE    STILL   HAVE                                              COGNITIVE  DIFFICULTIES                          7)       PATIENT  INCREASED   RISK  FOR                                   POST  TRUAMATIC     SEIZURE  ACTIVITY                           -    NO    CLINICAL   SEIZURE ACTIVITY                                                           8)        PATIENT     HAD    NEURO  DIAGNOSTIC  EVALUATIONS                                       IN   NOV. 2021                                   A)    CT   HEAD    SHOWED                                       NO  ACUTE  PATHOLOGY                                          POST  TRAUMATIC    ENCEPHALOMALACIA                                          INFERIOR    BI FRONTAL  LOBES                                  B)    EEG    AND  LABS       SHOWED                                             NO  SIGNIFICANT  ABNORMALITIES                                                     -    RESULTS    REVIEWED                                9)       PATIENT      DENIES    ANY   BALANCE    PROBLEMS. NO   DIZZINESS. NO  CLINICAL   SEIZURE  ACTIVITY. 10)       H/O   COVID  -19      IN   AUG.  2021    PRIOR  TO    HEAD  INJURY.                                  WITH   RESIDUAL     SMELL    AND  TASTE      DEFICIENTS                               11)      H/O    CHORIONIC   MILD  ANXIETY  AND  DEPRESSION                                             FOR     TWO   YEARS                                      RECOMMENDED     TO   FOLLOW  UP                                       WITH PSYCHOLOGY     AND  PSYCHIATRY                               12)         PATIENT  COMPLAINED     CONTINUED                                       MILD   RESIDUAL    COGNITIVE                                 DIFFICULTIES,   BRIEF   CONFUSION     AND                                   MILD      FRONTAL   HEADACHES     SINCE     HER  FALL  IN  AUG.  2021                                                                13)    H/O    WEAKNESS    &   DECREASED   HAND                                    BOTH  HANDS   AND   DROPPING  OBJECTS                                 WITH   NUMBNESS      SINCE      JAN. 2022 -   BILATERAL    CARPAL  TUNNEL  SYNDROME                              14)       RECOMMENDED  :                                  A)   ADEQUATE   REST  AND  SLEEP                                  B)     AVOID    ANY   TOBACCO  OR  ALCOHOL  USAGE                                 C)     BE  CAREFUL   WITH  HER  ACTIVITIES    INCLUDING   DRIVING                                 D)   NEURO  DIAGNOSTIC   EVALUATIONS                                  E)    MAY   GO   ER     IF    NEEDED                                                      15)       VARIOUS  RISK   FACTORS   WERE  REVIEWED   AND   DISCUSSED. PATIENT   HAS  MULTIPLE   MEDICAL, NEUROLOGICAL   PROBLEMS . PATIENT'S   MANAGEMENT  IS  CHALLENGING.                                         PRECIPITATING  FACTORS: including  fever/infection, exertion/relaxation, position change, stress,                weather change,   medications/alcohol, time of day/darkness/light  Are  absent                                                          MODIFYING  FACTORS:  fever/infection, exertion/relaxation, position change, stress, weather change,               medications/alcohol, time of day/darkness/light  Are  absent                Patient   Indicates   The  Presence   And  The  Absence  Of  The  Following    Associated  And             Additional  Neurological    Symptoms:                                Balance  And coordination   problems  absent           Gait problems     absent            Headaches      present              Migraines           absent           Memory problemspresent              Confusion        absent            Paresthesia   numbness          absent           Seizures  And  Starring  Episodes           absent           Syncope,  Near  syncopal episodes         absent           Speech   problems           absent             Swallowing   Problems      absent            Dizziness,  Light headedness absent              Vertigo        absent             Generalized   Weakness    absent              focal  Weakness     absent             Tremors         absent              Sleep  Problems     absent             History  Of   Recent  Head  Injury     absent             History  Of   Recent  TIA     absent             History  Of   Recent    Stroke     absent             Neck  Pain   and   Neck muscle  Spasms  absent               Radiating  down   And   Weakness           absent            Lower back   Pain  And     Spasms  absent              Radiating    Down   And   Weakness          absent                H/OFALLS        absent               History  Of   Visual  Symptoms    absent                  Associated   Diplopia       absent                                               Also   Additional   Symptoms   Present    As  Documented    In   The   detailed                  Review  Of  Systems   And    Please   Refer   To    Them for   Additional    Information. Any components  That are either  Unobtainable  Or  Limited  In   HPI, ROS  And/or PFSH   Are                   Due   ToPatient's  Medical  Problems,  Clinical  Condition   and/or lack of                                 other    Alternate   resources. RECORDS   REVIEWED:    historical medical records           INFORMATION   REVIEWED:     MEDICAL   HISTORY,SURGICAL   HISTORY,     MEDICATIONS   LIST,   ALLERGIES AND  DRUG  INTOLERANCES,       FAMILY   HISTORY,  SOCIAL  HISTORY,      PROBLEM  LIST   FOR  PATIENT  CARE   COORDINATION          History reviewed. No pertinent past medical history. History reviewed. No pertinent surgical history.       Current Outpatient Medications   Medication Sig Dispense Refill    ibuprofen (ADVIL;MOTRIN) 600 MG tablet Take 1 tablet by mouth every 6 hours for 5 days 20 tablet 0    acetaminophen (TYLENOL) 500 MG tablet Take 2 tablets by mouth 3 times daily for 7 days 42 tablet 0 No current facility-administered medications for this visit. Allergies   Allergen Reactions    Augmentin [Amoxicillin-Pot Clavulanate] Rash     As a child, unsure of reaction         History reviewed. No pertinent family history. Social History     Socioeconomic History    Marital status: Single     Spouse name: Not on file    Number of children: Not on file    Years of education: Not on file    Highest education level: Not on file   Occupational History    Not on file   Tobacco Use    Smoking status: Never    Smokeless tobacco: Never   Substance and Sexual Activity    Alcohol use:  Yes     Alcohol/week: 1.0 standard drink     Types: 1 Cans of beer per week    Drug use: No    Sexual activity: Not on file   Other Topics Concern    Not on file   Social History Narrative    Not on file     Social Determinants of Health     Financial Resource Strain: Not on file   Food Insecurity: Not on file   Transportation Needs: Not on file   Physical Activity: Not on file   Stress: Not on file   Social Connections: Not on file   Intimate Partner Violence: Not on file   Housing Stability: Not on file       Vitals:    08/08/22 1339   BP: 124/82   Pulse: 99   SpO2: 99%         Wt Readings from Last 3 Encounters:   08/08/22 226 lb 9.6 oz (102.8 kg)   11/17/21 190 lb (86.2 kg)   10/13/21 184 lb (83.5 kg)         BP Readings from Last 3 Encounters:   08/08/22 124/82   11/17/21 118/78   10/13/21 130/84           Hematology and Coagulation    Lab Results   Component Value Date/Time    WBC 5.9 11/15/2021 10:30 AM    RBC 3.90 11/15/2021 10:30 AM    HGB 13.1 11/15/2021 10:30 AM    HCT 39.9 11/15/2021 10:30 AM    .3 11/15/2021 10:30 AM    MCH 33.6 11/15/2021 10:30 AM    MCHC 32.8 11/15/2021 10:30 AM    RDW 11.2 11/15/2021 10:30 AM     11/15/2021 10:30 AM    MPV 9.9 11/15/2021 10:30 AM           Chemistries    Lab Results   Component Value Date/Time     11/15/2021 10:30 AM    K 4.0 11/15/2021 10:30 AM     11/15/2021 10:30 AM    CO2 25 11/15/2021 10:30 AM    BUN 6 08/13/2021 05:38 AM    CREATININE 0.46 08/13/2021 05:38 AM    CALCIUM 8.9 08/13/2021 05:38 AM       Lab Results   Component Value Date/Time    BUN 6 08/13/2021 05:38 AM    CREATININE 0.46 08/13/2021 05:38 AM     Lab Results   Component Value Date/Time    CALCIUM 8.9 08/13/2021 05:38 AM     .      Review of Systems   Constitutional:  Negative for appetite change and unexpected weight change. HENT:  Negative for dental problem, drooling, ear discharge, ear pain, facial swelling, hearing loss, mouth sores, nosebleeds, postnasal drip, sinus pressure, tinnitus, trouble swallowing and voice change. Eyes:  Negative for photophobia, pain, discharge, redness, itching and visual disturbance. Respiratory:  Negative for apnea, choking, chest tightness, shortness of breath and wheezing. Cardiovascular:  Negative for palpitations, leg swelling and near-syncope. Gastrointestinal:  Negative for abdominal distention, blood in stool, bowel incontinence, constipation and diarrhea. Endocrine: Negative for cold intolerance, heat intolerance, polydipsia, polyphagia and polyuria. Genitourinary:  Negative for bladder incontinence. Musculoskeletal:  Negative for back pain, gait problem and neck stiffness. Skin:  Negative for color change, pallor and wound. Allergic/Immunologic: Negative for environmental allergies, food allergies and immunocompromised state. Neurological:  Negative for dizziness, tremors, focal weakness, seizures, syncope, facial asymmetry, speech difficulty, light-headedness and loss of balance. Hematological:  Negative for adenopathy. Does not bruise/bleed easily. Psychiatric/Behavioral:  Positive for decreased concentration and memory loss. Negative for agitation, behavioral problems, confusion, dysphoric mood, hallucinations, self-injury, sleep disturbance and suicidal ideas.  The patient is not nervous/anxious and is not hyperactive. OBJECTIVE:      Physical Exam  Constitutional:       Appearance: She is well-developed. HENT:      Head: Normocephalic and atraumatic. No raccoon eyes or Thomas's sign. Right Ear: External ear normal.      Left Ear: External ear normal.      Nose: Nose normal.   Eyes:      Conjunctiva/sclera: Conjunctivae normal.      Pupils: Pupils are equal, round, and reactive to light. Neck:      Thyroid: No thyroid mass or thyromegaly. Vascular: No carotid bruit. Trachea: No tracheal deviation. Meningeal: Brudzinski's sign and Kernig's sign absent. Cardiovascular:      Rate and Rhythm: Normal rate and regular rhythm. Pulmonary:      Effort: Pulmonary effort is normal.   Musculoskeletal:         General: No tenderness. Normal range of motion. Cervical back: Normal range of motion and neck supple. No rigidity. No muscular tenderness. Normal range of motion. Skin:     General: Skin is warm. Coloration: Skin is not pale. Findings: No erythema or rash. Nails: There is no clubbing. Psychiatric:         Attention and Perception: She is attentive. Mood and Affect: Mood is not anxious or depressed. Affect is not labile, blunt or inappropriate. Speech: She is communicative. Speech is not rapid and pressured, delayed, slurred or tangential.         Behavior: Behavior is not agitated, slowed, aggressive, withdrawn, hyperactive or combative. Behavior is cooperative. Thought Content: Thought content is not paranoid or delusional. Thought content does not include homicidal or suicidal ideation. Thought content does not include homicidal or suicidal plan. Cognition and Memory: Cognition is impaired. She exhibits impaired recent memory. She does not exhibit impaired remote memory. Judgment: Judgment is not impulsive or inappropriate.              NEUROLOGICALEXAMINATION :       A) MENTAL STATUS:                   Alert and oriented  To time, place  And  Person. No Aphasia. No  Dysarthria. Able   To  Follow     SIMPLE    commands   without   Any  Difficulty. No right  To left confusion. Normal  Speech  And language function. Insight and  Judgment ,Fund  Of  Knowledge   within normal limits                Recent  And  Remote memory   DECREASED                Attention &  Concentration are within   normal limits                                                   B) CRANIAL NERVES :        CN : Visual  Acuity  And  Visual fields  within normal limits               Fundi  Could  Not  Be  Could  Not  Be  Evaluated. 3,4,6 CN : Both  Pupils are   Reactive and  Equal.  Movements  Are  Intact. No  Nystagmus. No  KYLE. No  Afferent  Pupillary  Defect noted. 5 CN :  Normal  Facial sensations and Corneal  Reflexes           7 CN:  Normal  Facial  Symmetry  And  Strength. No facial  Weakness. 8 CN :  Hearing  Appears within normal limits          9, 10 CN: Normal   spontaneous, reflex   palate   movements         11 CN:   Normal  Shoulder  shrug and  strength         12 CN :   Normal  Tongue movements and  Tongue  In midline                        No tongue   Fasciculations or atrophy       C) MOTOR  EXAM:                 Strength  In upper  And  Lower   extremities   within   normal limits               No  Drift. No  Atrophy               Rapid   alternating  And  repetitions  Movements  within   normal limits                 Muscle  Tone  In upper  And  Lower  Extremities  normal                No rigidity. No  Spasticity. Bradykinesia   absent                 No  Asterixis.               Sustention  Tremor , Resting   Tremor   absent                    No   other  Abnormal  Movements noted           D) SENSORY :               Light   touch, pinprick,   position  And  Vibration within normal limits        E) REFLEXES:                   Deep  Tendon  Reflexes   normal                  No  pathological  Reflexes  Bilaterally. F) COORDINATION  AND  GAIT :                                Station and  Gait  normal                              Romberg 's test negative                          Ataxia negative        ASSESSMENT:      Patient Active Problem List   Diagnosis    Subarachnoid bleed (HCC)    Traumatic subarachnoid hemorrhage with loss of consciousness of 1 hour to 5 hours 59 minutes (HCC)    Cortex (cerebral) contusion, with loss of consciousness ([address]    Fracture of occipital bone of skull with loss of consciousness ([address]    Encephalomalacia on imaging study    Post-concussion headache    Memory problem    COVID-19    Anosmia    Disturbance of smell and taste    Taste absent               CT OF THE HEAD WITHOUT CONTRAST  11/15/2021 10:16 am       TECHNIQUE:   CT of the head was performed without the administration of intravenous   contrast. Dose modulation, iterative reconstruction, and/or weight based   adjustment of the mA/kV was utilized to reduce the radiation dose to as low   as reasonably achievable. COMPARISON:   CT brain performed 08/30/2021. HISTORY:   ORDERING SYSTEM PROVIDED HISTORY: Traumatic subarachnoid hemorrhage with loss   of consciousness of 1 hour to 5 hours 59 minutes, sequela ([address]   TECHNOLOGIST PROVIDED HISTORY:   Is the patient pregnant?->No   Reason for Exam: Brain injury 3 months ago with bleeds, right sides headaches   still   Acuity: Acute   Type of Exam: Subsequent/Follow-up       FINDINGS:   BRAIN/VENTRICLES: There is no acute intracranial hemorrhage, mass effect, or   midline shift. There is a mild cephalo malacia involving the inferior aspect   of the frontal lobes bilaterally. There is satisfactory overall gray-white   matter differentiation.   The ventricular structures are symmetric and along parasagittal frontal parietal   regions no shift of midline structure. Basal cisterns are patent. ORBITS: The visualized portion of the orbits demonstrate no acute abnormality. SINUSES: Paranasal sinus identified. Air-fluid level identified both   maxillary sinuses. Moderate ethmoid sinus mucosal thickening. Mastoids   grossly clear. SOFT TISSUES/SKULL:  Right occipital bone fracture nondisplaced. Impression   Mild subarachnoid hemorrhage and areas of hemorrhagic contusion both anterior   frontal lobes. Right occipital bone fracture, nondisplaced       Critical results were called by Dr. Scott Rodriguez to Dr. Kavita Billings on 8/11/2021   at 23:47. CT OF THE CERVICAL SPINE WITHOUT CONTRAST 8/11/2021 10:25 pm       TECHNIQUE:   CT of the cervical spine was performed without the administration of   intravenous contrast. Multiplanar reformatted images are provided for review. Dose modulation, iterative reconstruction, and/or weight based adjustment of   the mA/kV was utilized to reduce the radiation dose to as low as reasonably   achievable. COMPARISON:   None. HISTORY:   ORDERING SYSTEM PROVIDED HISTORY: Trauma   TECHNOLOGIST PROVIDED HISTORY:   Trauma   Is the patient pregnant?->No   Reason for Exam: Trauma   Acuity: Acute   Type of Exam: Initial       FINDINGS:   BONES/ALIGNMENT: There is no acute fracture or traumatic malalignment of the   cervical spine. There is a right nondisplaced occipital bone fracture       DEGENERATIVE CHANGES: No significant degenerative changes. SOFT TISSUES: There is no prevertebral soft tissue swelling. Impression   No acute fracture traumatic malalignment of the cervical spine. Nondisplaced occipital bone fracture again identified. VISITING DIAGNOSIS:      No diagnosis found.                CONCERNS   &   INCREASED   RISK   FOR        *  SEIZURE  ACTIVITY,      *     MIGRAINES  AND   TENSION HOURS.            *    AVOID  ANY USAGE OF    TOBACCO,          AVOID     ANY     ALCOHOL  AND   ILLEGAL   SUBSTANCES                   -   REVIEWED    AND  DISCUSSED     WITH PATIENT IN  DETAIL            *  CONTINUE     TYLENOL       AS   NEEDED              AVOID     EXCESSIVE    USAGE  OF   IBUPROFEN               *    MIGRAINE/ HEADACHE    DAIRY   WITH  MONITORING                       OF  DURATION  AND  FREQUENCY. *  MEDICATIONS TO AVOID:    Ghulam Rolle              *    Prophylactic  Use   Of     Vitamin   B   Complex,  Folic  Acid,    Vitamin  B12    Multivitamin,       Calcium  With  magnesium  And  Vit D    Supplementations   Over  The  Counter  Discussed             *  PATIENT  IS  ALSO   COUNSELED   TO  KEEP    ACTIVITIES:         A)   SIMPLE      B)  ORGANIZED      C)  WRITEDOWN                     *  EVALUATIONS  AND  FOLLOW UP:                 *  SPEECH  THERAPY                                            *    PATIENT  DENIES   BEING  PREGNANT                  AND   HAS  NO  PLANS  TO  GET  PREGNANT.                        *         PATIENT  COMPLAINED     CONTINUED                                       MILD   RESIDUAL    COGNITIVE                                 DIFFICULTIES,   BRIEF   CONFUSION     AND                                   MILD      FRONTAL   HEADACHES     SINCE     HER  FALL  IN  AUG.  2021                                                                *   H/O    WEAKNESS    &   DECREASED   HAND                                    BOTH  HANDS   AND   DROPPING  OBJECTS                                 WITH   NUMBNESS      SINCE      JAN. 2022                                     -   BILATERAL    CARPAL  TUNNEL  SYNDROME                              *      RECOMMENDED  :                                  A)   ADEQUATE   REST  AND  SLEEP                                  B)     AVOID    ANY   TOBACCO  OR  ALCOHOL  USAGE                                 C)     BE  CAREFUL WITH  HER  ACTIVITIES    INCLUDING   DRIVING                                 D)   NEURO  DIAGNOSTIC   EVALUATIONS                                  E)    MAY   GO   ER     IF    NEEDED                                               Orders Placed This Encounter   Procedures    CT HEAD WO CONTRAST    CBC    Electrolyte Panel    TSH    Vitamin B12 & Folate    External Referral To Psychology    External Referral To Psychiatry    EEG    EMG             *PATIENT   TO  FOLLOW  UP  WITH   PRIMARY  CARE         OTHER  CONSULTANTS  AS  BEFORE. *  Maintain   Healthy  Life Style    With   Periodic  Monitoring  Of          Any  Medical  Conditions  Including   Elevated  Blood  Pressure,  Lipid  Profile,        Blood  Sugar levels  AndHeart  Disease. *   Period   Screening  For  Cancers  Involving  Breast,  Colon,         Lungs  And  Other  Organs  As  Applicable,  In consultation   With  Your  Primary Care Providers. *Second  Neurological  Opinion  And  Evaluations  In  [facility]  Setting  If  Patient  Is  Interested. * Please   Contact   Neurology  Clinic   Early   If   Are  Any  New  Neurological   And  Any neurological  Concerns. *  If  The  Patient remains  Neurologically  Stable   Return   To  [facility] Neurology Department   IN     3 - 6          MONTHS  TIME   FOR  FURTHER              FOLLOW UP.                       *   The  Neurological   Findings,  Possible  Diagnosis,  Differential diagnoses                    And  Options  For    Further   Investigations                   And  management   Are  Discussed  Comprehensively. Medications   And  Prescription   Risks  And  Side effects  Are   Also  Discussed.                      *  If   There is  Any  Significant  Worsening   Of  Current  Symptoms  And  Or                  If patient  Develops   Any additional  New  NeurologicalSymptoms Or  Significant  Concerns   Should  Call  911 or  Go  To  Emergency  Department                  For  Further  Immediate  Evaluation and  management . The   Above  Were  Reviewed  With  PATIENT   and   HER  MOTHER                           questions  Answered  In  Detail. TOTAL   TIME     SPENT :                On this date       08/08/2022     I have spent    40  minutes    reviewing previous notes, test results               and face to face time with the patient including   discussing the diagnosis and importance of compliance               with the treatment plan  as well as documenting on the day of the visit. Electronically signed by   Davin Germain M.D., [place]. Board Certified in  Neurology &  In  [facility] ([facility])      DISCLAIMER:   Although every effort was made to ensure the accuracy of this  electronictranscription, some errors in transcription may have occurred. GENERAL PATIENT INSTRUCTIONS:     A Healthy Lifestyle: Care Instructions  Your Care Instructions  A healthy lifestyle can help you feel good, stay at ahealthy weight, and have plenty of energy for both work and play. A healthy lifestyle is something you can share with your whole family. A healthy lifestyle also can lower your risk for serious health problems, such ashigh blood pressure, heart disease, and diabetes. You can follow a few steps listed below to improve your health and the health of your family. Follow-up careis a key part of your treatment and safety. Be sure to make and go to all appointments, and call your doctor if you are having problems. Its also a good idea to know your test results and keep a list of the medicines you take. How can you care for yourself at home? Do not eat too much sugar, fat, or fast foods.  You can still have dessert and treats nowand then. The goal is moderation. Start small to improve your eating habits. Pay attention to portion sizes, drink less juice and soda pop, and eat more fruits and vegetables. Eat a healthy amount of food. A 3-ounce serving of meat, for example, is about the size of a deck of cards. Fill the rest of your plate with vegetables and whole grains. Limit theamount of soda and sports drinks you have every day. Drink more water when you are thirsty. Eat at least 5 servings of fruits and vegetables every day. It may seem like a lot, but it is not hard to reach this goal. Aserving or helping is 1 piece of fruit, 1 cup of vegetables, or 2 cups of leafy, raw vegetables. Have an apple or some carrot sticks as an afternoon snack instead of a candy bar. Try to have fruits and/or vegetables at everymeal.  Make exercise part of your daily routine. You may want to start with simple activities, such as walking, bicycling, or slow swimming. Try keaton active 30 to 60 minutes every day. You do not need to do all 30 to 60 minutes all at once. For example, you can exercise 3 times a day for 10 or 20 minutes. Moderate exercise is safe for most people, but it is always agood idea to talk to your doctor before starting an exercise program.  Keep moving. Noel Porter the lawn, work in the garden, or [employer]. Take the stairs instead of the elevator at work. If you smoke, quit. Peoplewho smoke have an increased risk for heart attack, stroke, cancer, and other lung illnesses. Quitting is hard, but there are ways to boost your chance of quitting tobacco for good. Use nicotine gum, patches, or lozenges. Ask your doctor about stop-smoking programs and medicines. Keep trying.   In addition to reducing your risk of diseases in the future, you will notice some benefits soon after you stop using tobacco. If you have shortness of breath or asthma symptoms, they will likely getbetter within a few weeks after you quit.  Limit how much alcohol you drink. Moderate amounts of alcohol (up to 2 drinks a day for men, 1drink a day for women) are okay. But drinking too much can lead to liver problems, high blood pressure, and other health problems. health  If you have a family, there are many things you can do together to improve your health. Eat meals together as a family as often as possible. Eat healthy foods. This includes fruits, vegetables, lean meats and dairy, and whole grains. Include your family in your fitness plan. Most peoplethink of activities such as jogging or tennis as the way to fitness, but there are many ways you and your family can be more active. Anything that makes you breathe hard and gets your heart pumping is exercise. Here are sometips:  Walk to do errands or to take your child to school or the bus. Go for a family bike ride after dinner instead of watching TV. Where can you learn more? Go toPlayhemtps://HazelMailpeBizratings.[email or web address].VeriWave. org and sign in to your [employer] account. Enter G411 in the Search HealthInformation box to learn more about \"A Healthy Lifestyle: Care Instructions. \"     If you do not have anaccount, please click on the \"Sign Up Now\" link. Current as of: July 26, 2016  Content Version: 11.2  © 0614-6731 [employer]. Care instructions adapted under license by [facility] ([facility]). If you have questions about a medical condition or this instruction, always ask your healthcare professional. [employer] disclaims any warranty or liability for your use of this information.

## 2022-08-09 LAB
FOLATE: 8.1 NG/ML
VITAMIN B-12: 458 PG/ML (ref 232–1245)

## 2022-09-28 ENCOUNTER — HOSPITAL ENCOUNTER (OUTPATIENT)
Dept: CT IMAGING | Age: 27
Discharge: HOME | End: 2022-09-30
Payer: COMMERCIAL

## 2022-09-28 DIAGNOSIS — S06.2X9S: Chronic | ICD-10-CM

## 2022-09-28 PROCEDURE — 70450 CT HEAD/BRAIN W/O DYE: CPT

## 2023-03-07 ENCOUNTER — OFFICE VISIT (OUTPATIENT)
Dept: PRIMARY CARE CLINIC | Age: 28
End: 2023-03-07

## 2023-03-07 VITALS
TEMPERATURE: 97.4 F | DIASTOLIC BLOOD PRESSURE: 88 MMHG | RESPIRATION RATE: 14 BRPM | SYSTOLIC BLOOD PRESSURE: 122 MMHG | OXYGEN SATURATION: 97 % | BODY MASS INDEX: 37.67 KG/M2 | HEIGHT: 67 IN | HEART RATE: 64 BPM | WEIGHT: 240 LBS

## 2023-03-07 DIAGNOSIS — S46.912A SHOULDER STRAIN, LEFT, INITIAL ENCOUNTER: Primary | ICD-10-CM
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
## 2023-03-07 RX ORDER — PREDNISONE 20 MG/1
20 TABLET ORAL 2 TIMES DAILY
Qty: 10 TABLET | Refills: 0 | Status: SHIPPED | OUTPATIENT
Start: 2023-03-07 | End: 2023-03-12

## 2023-03-07 SDOH — ECONOMIC STABILITY: HOUSING INSECURITY
IN THE LAST 12 MONTHS, WAS THERE A TIME WHEN YOU DID NOT HAVE A STEADY PLACE TO SLEEP OR SLEPT IN A SHELTER (INCLUDING NOW)?: NO

## 2023-03-07 SDOH — ECONOMIC STABILITY: FOOD INSECURITY: WITHIN THE PAST 12 MONTHS, THE FOOD YOU BOUGHT JUST DIDN'T LAST AND YOU DIDN'T HAVE MONEY TO GET MORE.: NEVER TRUE

## 2023-03-07 SDOH — ECONOMIC STABILITY: FOOD INSECURITY: WITHIN THE PAST 12 MONTHS, YOU WORRIED THAT YOUR FOOD WOULD RUN OUT BEFORE YOU GOT MONEY TO BUY MORE.: NEVER TRUE

## 2023-03-07 SDOH — ECONOMIC STABILITY: INCOME INSECURITY: HOW HARD IS IT FOR YOU TO PAY FOR THE VERY BASICS LIKE FOOD, HOUSING, MEDICAL CARE, AND HEATING?: NOT HARD AT ALL

## 2023-03-07 ASSESSMENT — PATIENT HEALTH QUESTIONNAIRE - PHQ9
SUM OF ALL RESPONSES TO PHQ QUESTIONS 1-9: 0
1. LITTLE INTEREST OR PLEASURE IN DOING THINGS: 0
SUM OF ALL RESPONSES TO PHQ QUESTIONS 1-9: 0
SUM OF ALL RESPONSES TO PHQ QUESTIONS 1-9: 0
SUM OF ALL RESPONSES TO PHQ9 QUESTIONS 1 & 2: 0
SUM OF ALL RESPONSES TO PHQ QUESTIONS 1-9: 0
2. FEELING DOWN, DEPRESSED OR HOPELESS: 0

## 2023-03-07 ASSESSMENT — ENCOUNTER SYMPTOMS
CONSTIPATION: 0
BLOOD IN STOOL: 0
VOMITING: 0
NAUSEA: 0
WHEEZING: 0
ABDOMINAL PAIN: 0
SHORTNESS OF BREATH: 0
COLOR CHANGE: 0
RHINORRHEA: 0
BACK PAIN: 0
DIARRHEA: 0

## 2023-03-07 NOTE — PROGRESS NOTES
[address]  Urgent Care A department of [facility]  [facility]  Phone: [phone]  Fax: [phone]      Jacinto Hobbs is a 28 y.o. female who presents to the [facility] today for her medical conditions/complaints as noted below. Jacinto Hobbs is c/o of Fall (Pt reports Saturday 3/4 she fell down about 7 steps when she stayed at a friends house and didn't realize where the steps were in the dark. Pt with left upper arm following. Pt with arm in a sling and using tylenol. )          HPI:     Fall  The accident occurred 2 days ago. The fall occurred while walking (down approximately 6-7 steps). She fell from an unknown height. She landed on Hard floor. There was no blood loss. The point of impact was the left shoulder. The pain is present in the left upper arm (left deltoid pain). The pain is at a severity of 3/10. The pain is mild. The symptoms are aggravated by movement. Pertinent negatives include no abdominal pain, fever, headaches, hematuria, loss of consciousness, nausea, numbness, tingling or vomiting. She has tried acetaminophen for the symptoms. The treatment provided mild relief. History reviewed. No pertinent past medical history.      Allergies   Allergen Reactions    Augmentin [Amoxicillin-Pot Clavulanate] Rash     As a child, unsure of reaction       Wt Readings from Last 3 Encounters:   03/07/23 240 lb (108.9 kg)   08/08/22 226 lb 9.6 oz (102.8 kg)   11/17/21 190 lb (86.2 kg)     BP Readings from Last 3 Encounters:   03/07/23 122/88   08/08/22 124/82   11/17/21 118/78      Temp Readings from Last 3 Encounters:   03/07/23 97.4 °F (36.3 °C) (Tympanic)   08/13/21 98.7 °F (37.1 °C) (Axillary)   03/25/20 98.6 °F (37 °C) (Tympanic)     Pulse Readings from Last 3 Encounters:   03/07/23 64   08/08/22 99   11/17/21 75     SpO2 Readings from Last 3 Encounters:   03/07/23 97%   08/08/22 99%   11/17/21 98%       Subjective:      Review of Systems   Constitutional:  Negative for fatigue and fever. HENT:  Negative for congestion and rhinorrhea. Respiratory:  Negative for shortness of breath and wheezing. Cardiovascular:  Negative for chest pain and palpitations. Gastrointestinal:  Negative for abdominal pain, blood in stool, constipation, diarrhea, nausea and vomiting. Endocrine: Negative for polydipsia and polyuria. Genitourinary:  Negative for dysuria and hematuria. Musculoskeletal:  Positive for myalgias. Negative for arthralgias, back pain, gait problem, joint swelling, neck pain and neck stiffness. Skin:  Negative for color change and wound. Neurological:  Negative for dizziness, tingling, seizures, loss of consciousness, numbness and headaches. Hematological:  Negative for adenopathy. Does not bruise/bleed easily. Psychiatric/Behavioral:  Negative for dysphoric mood. The patient is not nervous/anxious. Objective:     Vitals:    03/07/23 1101   BP: 122/88   Site: Right Upper Arm   Position: Sitting   Cuff Size: Large Adult   Pulse: 64   Resp: 14   Temp: 97.4 °F (36.3 °C)   TempSrc: Tympanic   SpO2: 97%   Weight: 240 lb (108.9 kg)   Height: 5' 7\" (1.702 m)     Body mass index is 37.59 kg/m². /88 (Site: Right Upper Arm, Position: Sitting, Cuff Size: Large Adult)   Pulse 64   Temp 97.4 °F (36.3 °C) (Tympanic)   Resp 14   Ht 5' 7\" (1.702 m)   Wt 240 lb (108.9 kg)   SpO2 97%   BMI 37.59 kg/m²   Physical Exam  Vitals reviewed. Constitutional:       General: She is not in acute distress. HENT:      Head: Normocephalic. Eyes:      Extraocular Movements: Extraocular movements intact. Pupils: Pupils are equal, round, and reactive to light. Cardiovascular:      Rate and Rhythm: Normal rate and regular rhythm. Heart sounds: No murmur heard. Pulmonary:      Effort: Pulmonary effort is normal.      Breath sounds: Normal breath sounds. Musculoskeletal:         General: No swelling.       Right shoulder: Normal.      Left shoulder: Tenderness present. No bony tenderness. Normal strength. Right upper arm: Normal.      Left upper arm: Tenderness present. No swelling, edema or bony tenderness. Right elbow: Normal.      Left elbow: Normal.        Arms:       Cervical back: Neck supple. Neurological:      General: No focal deficit present. Mental Status: She is alert and oriented to person, place, and time. Psychiatric:         Mood and Affect: Mood normal.         Behavior: Behavior normal.       Assessment and Plan      Diagnosis Orders   1. Shoulder strain, left, initial encounter  predniSONE (DELTASONE) 20 MG tablet      2. Fall, initial encounter          Orders Placed This Encounter    predniSONE (DELTASONE) 20 MG tablet     Sig: Take 1 tablet by mouth 2 times daily for 5 days     Dispense:  10 tablet     Refill:  0     Pleasant 28year old female presents with x2 days of left shoulder pain, in deltoid muscle area after falling down approximately 6-7 steps, non-carpeted. Denies hit to head, no LOC. Pain to lateral deltoid muscle, pain is reproducible, full ROM to left shoulder, benign neuro exam in clinic today, neurovascularly intact. Discussed getting xrays however patient prefers to wait.   -prednisone x5 days  -rotate tylenol/ibuprofen for pain, only start ibuprofen/NSAIDS after course of steroid is completed. -complete exercises and stretches provided as tolerated. -ACE bandage applied for compression and comfort.   -may apply ice/heat for 15-20 minutes every 3/4 hours   -return for continued or worsening symptoms  Discussed exam, POCT findings, plan of care, and follow-up at length with patient. Reviewed all prescribed and recommended medications, administration and side effects. Encouraged patient to follow up with PCP or return to the clinic for no improvement and or worsening of symptoms.  All questions were answered and they verbalized understanding and were agreeable with the plan. Follow up as needed.         Electronically signed by FLAVIO Mlegar CNP on 3/7/2023 at 11:30 AM

## 2023-03-07 NOTE — PATIENT INSTRUCTIONS
Complete exercises/stretches as tolerated  Please utilize PRICE protocol  Protect  Rest  Ice  Compression  Elevation
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
## 2024-05-22 ENCOUNTER — HOSPITAL ENCOUNTER (OUTPATIENT)
Age: 29
Discharge: HOME | End: 2024-05-22
Payer: COMMERCIAL

## 2024-05-22 ENCOUNTER — OFFICE VISIT (OUTPATIENT)
Dept: PRIMARY CARE CLINIC | Age: 29
End: 2024-05-22
Payer: COMMERCIAL

## 2024-05-22 ENCOUNTER — HOSPITAL ENCOUNTER (OUTPATIENT)
Dept: ULTRASOUND IMAGING | Age: 29
Discharge: HOME | End: 2024-05-24
Payer: COMMERCIAL

## 2024-05-22 VITALS
WEIGHT: 224 LBS | HEART RATE: 81 BPM | OXYGEN SATURATION: 99 % | HEIGHT: 67 IN | DIASTOLIC BLOOD PRESSURE: 76 MMHG | BODY MASS INDEX: 35.16 KG/M2 | TEMPERATURE: 97.7 F | RESPIRATION RATE: 15 BRPM | SYSTOLIC BLOOD PRESSURE: 126 MMHG

## 2024-05-22 DIAGNOSIS — N92.1 MENORRHAGIA WITH IRREGULAR CYCLE: ICD-10-CM

## 2024-05-22 DIAGNOSIS — N92.1 MENORRHAGIA WITH IRREGULAR CYCLE: Primary | ICD-10-CM

## 2024-05-22 LAB
BASOPHILS # BLD: 0.04 K/UL (ref 0–0.2)
BASOPHILS NFR BLD: 1 % (ref 0–2)
EOSINOPHIL # BLD: 0.11 K/UL (ref 0–0.44)
EOSINOPHILS RELATIVE PERCENT: 2 % (ref 1–4)
ERYTHROCYTE [DISTWIDTH] IN BLOOD BY AUTOMATED COUNT: 11.9 % (ref 11.8–14.4)
HCT VFR BLD AUTO: 36.3 % (ref 36.3–47.1)
HGB BLD-MCNC: 12.3 G/DL (ref 11.9–15.1)
IMM GRANULOCYTES # BLD AUTO: <0.03 K/UL (ref 0–0.3)
IMM GRANULOCYTES NFR BLD: 0 %
LYMPHOCYTES NFR BLD: 1.43 K/UL (ref 1.1–3.7)
LYMPHOCYTES RELATIVE PERCENT: 23 % (ref 24–43)
MCH RBC QN AUTO: 32.7 PG (ref 25.2–33.5)
MCHC RBC AUTO-ENTMCNC: 33.9 G/DL (ref 25.2–33.5)
MCV RBC AUTO: 96.5 FL (ref 82.6–102.9)
MONOCYTES NFR BLD: 0.64 K/UL (ref 0.1–1.2)
MONOCYTES NFR BLD: 11 % (ref 3–12)
NEUTROPHILS NFR BLD: 63 % (ref 36–65)
NEUTS SEG NFR BLD: 3.87 K/UL (ref 1.5–8.1)
NRBC BLD-RTO: 0 PER 100 WBC
PLATELET # BLD AUTO: 260 K/UL (ref 138–453)
PMV BLD AUTO: 9.4 FL (ref 8.1–13.5)
RBC # BLD AUTO: 3.76 M/UL (ref 3.95–5.11)
TSH SERPL DL<=0.05 MIU/L-ACNC: 3.64 UIU/ML (ref 0.3–5)
WBC OTHER # BLD: 6.1 K/UL (ref 3.5–11.3)

## 2024-05-22 PROCEDURE — 36415 COLL VENOUS BLD VENIPUNCTURE: CPT

## 2024-05-22 PROCEDURE — 84443 ASSAY THYROID STIM HORMONE: CPT

## 2024-05-22 PROCEDURE — 76856 US EXAM PELVIC COMPLETE: CPT

## 2024-05-22 PROCEDURE — 99213 OFFICE O/P EST LOW 20 MIN: CPT

## 2024-05-22 PROCEDURE — 85025 COMPLETE CBC W/AUTO DIFF WBC: CPT

## 2024-05-22 ASSESSMENT — ENCOUNTER SYMPTOMS
ABDOMINAL PAIN: 1
ALLERGIC/IMMUNOLOGIC NEGATIVE: 1
VOMITING: 0
CONSTIPATION: 0
DIARRHEA: 0
EYES NEGATIVE: 1
RESPIRATORY NEGATIVE: 1
BACK PAIN: 0
NAUSEA: 0

## 2024-05-22 NOTE — PROGRESS NOTES
with irregular cycle  CBC with Auto Differential    US PELVIS COMPLETE    Lucinda Enrique CNM, OB/GYN, [place]                Plan:   Assessment & Plan   Return if symptoms worsen or fail to improve.    Will have labs completed and call with results  Discussed US results in office  Referral to gynecology sent  Increase water intake  If symptoms worsen follow up with PCP  Or return to walk in clinic  Patient verbalized understanding and agrees with plan of care    Orders Placed This Encounter   Procedures    US PELVIS COMPLETE     Standing Status:   Future     Standing Expiration Date:   6/22/2024     Order Specific Question:   Reason for exam:     Answer:   heavy menses x 3 months    CBC with Auto Differential     Standing Status:   Future     Standing Expiration Date:   5/22/2025    Lucinda Enrique CNM, OB/GYN, [place]     Referral Priority:   Routine     Referral Type:   Eval and Treat     Referral Reason:   Specialty Services Required     Referred to Provider:   Lucinda Garcia APRN - CNM     Requested Specialty:   Certified Nurse Midwife     Number of Visits Requested:   1     No orders of the defined types were placed in this encounter.      Patientgiven educational materials - see patient instructions.  Discussed use, benefit,and side effects of prescribed medications.  All patient questions answered. Ptvoiced understanding. Reviewed health maintenance.  Instructed to continue currentmedications, diet and exercise.  Patient agreed with treatment plan. Follow up asdirected.     Electronically signed by FLAVIO Akbar CNP on 5/22/2024 at 10:28 AM

## 2024-05-22 NOTE — PATIENT INSTRUCTIONS
Will have labs completed and call with results  Will send for US   Referral to gynecology sent  Increase water intake  If symptoms worsen follow up with PCP  Or return to walk in clinic  Patient verbalized understanding and agrees with plan of care

## 2025-06-16 NOTE — PLAN OF CARE
Problem: Airway Clearance - Ineffective  Goal: Achieve or maintain patent airway  8/13/2021 1404 by Milana Graves RN  Outcome: Ongoing     Problem: Gas Exchange - Impaired  Goal: Absence of hypoxia  8/13/2021 1404 by Milana Graves RN  Outcome: Ongoing     Problem: Gas Exchange - Impaired  Goal: Promote optimal lung function  8/13/2021 1404 by Milana Graves RN  Outcome: Ongoing     Problem: Breathing Pattern - Ineffective  Goal: Ability to achieve and maintain a regular respiratory rate  8/13/2021 1404 by Milana Graves RN  Outcome: Ongoing     Problem:  Body Temperature -  Risk of, Imbalanced  Goal: Ability to maintain a body temperature within defined limits  8/13/2021 1404 by Milana Graves RN  Outcome: Ongoing   Electronically signed by Milana Graves RN on 8/13/2021 at 2:04 PM Email